# Patient Record
Sex: MALE | Race: WHITE | Employment: OTHER | URBAN - METROPOLITAN AREA
[De-identification: names, ages, dates, MRNs, and addresses within clinical notes are randomized per-mention and may not be internally consistent; named-entity substitution may affect disease eponyms.]

---

## 2017-11-09 PROBLEM — G45.9 TIA (TRANSIENT ISCHEMIC ATTACK): Status: ACTIVE | Noted: 2017-11-09

## 2017-11-09 PROBLEM — I10 HTN (HYPERTENSION): Status: ACTIVE | Noted: 2017-11-09

## 2018-01-09 PROBLEM — I63.9 ACUTE CVA (CEREBROVASCULAR ACCIDENT) (HCC): Status: ACTIVE | Noted: 2018-01-09

## 2018-01-10 PROBLEM — R00.1 BRADYCARDIA: Status: ACTIVE | Noted: 2018-01-10

## 2018-01-13 PROBLEM — E44.0 MODERATE MALNUTRITION (HCC): Chronic | Status: ACTIVE | Noted: 2018-01-13

## 2018-01-17 PROBLEM — I63.9 ISCHEMIC STROKE (HCC): Status: ACTIVE | Noted: 2018-01-17

## 2018-06-08 PROBLEM — K80.50 CHOLELITHIASIS, COMMON BILE DUCT: Status: ACTIVE | Noted: 2018-06-08

## 2018-08-28 ENCOUNTER — HOSPITAL ENCOUNTER (EMERGENCY)
Age: 83
Discharge: HOSPICE | DRG: 179 | End: 2018-08-29
Attending: EMERGENCY MEDICINE | Admitting: INTERNAL MEDICINE
Payer: MEDICARE

## 2018-08-28 DIAGNOSIS — R09.02 HYPOXIA: ICD-10-CM

## 2018-08-28 DIAGNOSIS — J18.9 HCAP (HEALTHCARE-ASSOCIATED PNEUMONIA): Primary | ICD-10-CM

## 2018-08-28 PROCEDURE — 99285 EMERGENCY DEPT VISIT HI MDM: CPT

## 2018-08-29 ENCOUNTER — APPOINTMENT (OUTPATIENT)
Dept: GENERAL RADIOLOGY | Age: 83
DRG: 179 | End: 2018-08-29
Payer: MEDICARE

## 2018-08-29 VITALS
WEIGHT: 150 LBS | BODY MASS INDEX: 21 KG/M2 | RESPIRATION RATE: 20 BRPM | DIASTOLIC BLOOD PRESSURE: 38 MMHG | TEMPERATURE: 97.9 F | OXYGEN SATURATION: 100 % | HEART RATE: 62 BPM | HEIGHT: 71 IN | SYSTOLIC BLOOD PRESSURE: 88 MMHG

## 2018-08-29 PROBLEM — R06.02 SHORTNESS OF BREATH: Status: ACTIVE | Noted: 2018-08-29

## 2018-08-29 LAB
ALBUMIN SERPL-MCNC: 3.8 G/DL (ref 3.4–5)
ALP BLD-CCNC: 180 U/L (ref 40–129)
ALT SERPL-CCNC: 50 U/L (ref 10–40)
ANION GAP SERPL CALCULATED.3IONS-SCNC: 11 MMOL/L (ref 3–16)
ANION GAP SERPL CALCULATED.3IONS-SCNC: 12 MMOL/L (ref 3–16)
AST SERPL-CCNC: 60 U/L (ref 15–37)
BANDED NEUTROPHILS RELATIVE PERCENT: 6 % (ref 0–7)
BASE EXCESS VENOUS: 8 (ref -3–3)
BASOPHILS ABSOLUTE: 0 K/UL (ref 0–0.2)
BASOPHILS ABSOLUTE: 0 K/UL (ref 0–0.2)
BASOPHILS RELATIVE PERCENT: 0 %
BASOPHILS RELATIVE PERCENT: 0 %
BILIRUB SERPL-MCNC: 1.7 MG/DL (ref 0–1)
BILIRUBIN DIRECT: 0.7 MG/DL (ref 0–0.3)
BILIRUBIN URINE: NEGATIVE
BILIRUBIN, INDIRECT: 1 MG/DL (ref 0–1)
BLOOD, URINE: NEGATIVE
BUN BLDV-MCNC: 46 MG/DL (ref 7–20)
BUN BLDV-MCNC: 48 MG/DL (ref 7–20)
CALCIUM SERPL-MCNC: 9.3 MG/DL (ref 8.3–10.6)
CALCIUM SERPL-MCNC: 9.4 MG/DL (ref 8.3–10.6)
CHLORIDE BLD-SCNC: 92 MMOL/L (ref 99–110)
CHLORIDE BLD-SCNC: 95 MMOL/L (ref 99–110)
CLARITY: CLEAR
CO2: 27 MMOL/L (ref 21–32)
CO2: 31 MMOL/L (ref 21–32)
COLOR: YELLOW
CREAT SERPL-MCNC: 1.1 MG/DL (ref 0.8–1.3)
CREAT SERPL-MCNC: 1.2 MG/DL (ref 0.8–1.3)
EOSINOPHILS ABSOLUTE: 0 K/UL (ref 0–0.6)
EOSINOPHILS ABSOLUTE: 0 K/UL (ref 0–0.6)
EOSINOPHILS RELATIVE PERCENT: 0 %
EOSINOPHILS RELATIVE PERCENT: 0 %
GFR AFRICAN AMERICAN: >60
GFR AFRICAN AMERICAN: >60
GFR NON-AFRICAN AMERICAN: 57
GFR NON-AFRICAN AMERICAN: >60
GLUCOSE BLD-MCNC: 124 MG/DL (ref 70–99)
GLUCOSE BLD-MCNC: 135 MG/DL (ref 70–99)
GLUCOSE URINE: NEGATIVE MG/DL
HCO3 VENOUS: 31 MMOL/L (ref 23–29)
HCT VFR BLD CALC: 30.9 % (ref 40.5–52.5)
HCT VFR BLD CALC: 35.1 % (ref 40.5–52.5)
HEMOGLOBIN: 10.9 G/DL (ref 13.5–17.5)
HEMOGLOBIN: 12.4 G/DL (ref 13.5–17.5)
KETONES, URINE: NEGATIVE MG/DL
LACTATE: 1.35 MMOL/L (ref 0.4–2)
LACTIC ACID: 1.7 MMOL/L (ref 0.4–2)
LEUKOCYTE ESTERASE, URINE: NEGATIVE
LYMPHOCYTES ABSOLUTE: 0.2 K/UL (ref 1–5.1)
LYMPHOCYTES ABSOLUTE: 0.8 K/UL (ref 1–5.1)
LYMPHOCYTES RELATIVE PERCENT: 1 %
LYMPHOCYTES RELATIVE PERCENT: 3.2 %
MAGNESIUM: 1.9 MG/DL (ref 1.8–2.4)
MCH RBC QN AUTO: 33.6 PG (ref 26–34)
MCH RBC QN AUTO: 33.7 PG (ref 26–34)
MCHC RBC AUTO-ENTMCNC: 35.3 G/DL (ref 31–36)
MCHC RBC AUTO-ENTMCNC: 35.3 G/DL (ref 31–36)
MCV RBC AUTO: 95.1 FL (ref 80–100)
MCV RBC AUTO: 95.3 FL (ref 80–100)
MICROSCOPIC EXAMINATION: NORMAL
MONOCYTES ABSOLUTE: 0.6 K/UL (ref 0–1.3)
MONOCYTES ABSOLUTE: 1.6 K/UL (ref 0–1.3)
MONOCYTES RELATIVE PERCENT: 4 %
MONOCYTES RELATIVE PERCENT: 6.5 %
NEUTROPHILS ABSOLUTE: 14.8 K/UL (ref 1.7–7.7)
NEUTROPHILS ABSOLUTE: 21.7 K/UL (ref 1.7–7.7)
NEUTROPHILS RELATIVE PERCENT: 89 %
NEUTROPHILS RELATIVE PERCENT: 90.3 %
NITRITE, URINE: NEGATIVE
O2 SAT, VEN: 51 %
PCO2, VEN: 40.5 MM HG (ref 40–50)
PDW BLD-RTO: 15 % (ref 12.4–15.4)
PDW BLD-RTO: 15.1 % (ref 12.4–15.4)
PERFORMED ON: ABNORMAL
PH UA: 6
PH VENOUS: 7.49 (ref 7.35–7.45)
PLATELET # BLD: 138 K/UL (ref 135–450)
PLATELET # BLD: 156 K/UL (ref 135–450)
PMV BLD AUTO: 8.6 FL (ref 5–10.5)
PMV BLD AUTO: 8.7 FL (ref 5–10.5)
PO2, VEN: 25 MM HG
POC SAMPLE TYPE: ABNORMAL
POTASSIUM REFLEX MAGNESIUM: 3.3 MMOL/L (ref 3.5–5.1)
POTASSIUM REFLEX MAGNESIUM: 3.6 MMOL/L (ref 3.5–5.1)
PRO-BNP: 693 PG/ML (ref 0–449)
PROCALCITONIN: 3.3 NG/ML (ref 0–0.15)
PROTEIN UA: NEGATIVE MG/DL
RBC # BLD: 3.24 M/UL (ref 4.2–5.9)
RBC # BLD: 3.69 M/UL (ref 4.2–5.9)
SODIUM BLD-SCNC: 134 MMOL/L (ref 136–145)
SODIUM BLD-SCNC: 134 MMOL/L (ref 136–145)
SPECIFIC GRAVITY UA: <=1.005
TCO2 CALC VENOUS: 32 MMOL/L
TOTAL PROTEIN: 6.8 G/DL (ref 6.4–8.2)
TROPONIN: <0.01 NG/ML
URINE REFLEX TO CULTURE: NORMAL
URINE TYPE: NORMAL
UROBILINOGEN, URINE: 0.2 E.U./DL
WBC # BLD: 15.6 K/UL (ref 4–11)
WBC # BLD: 24 K/UL (ref 4–11)

## 2018-08-29 PROCEDURE — 36415 COLL VENOUS BLD VENIPUNCTURE: CPT

## 2018-08-29 PROCEDURE — 80048 BASIC METABOLIC PNL TOTAL CA: CPT

## 2018-08-29 PROCEDURE — 81003 URINALYSIS AUTO W/O SCOPE: CPT

## 2018-08-29 PROCEDURE — 87205 SMEAR GRAM STAIN: CPT

## 2018-08-29 PROCEDURE — 96365 THER/PROPH/DIAG IV INF INIT: CPT

## 2018-08-29 PROCEDURE — 83880 ASSAY OF NATRIURETIC PEPTIDE: CPT

## 2018-08-29 PROCEDURE — 85025 COMPLETE CBC W/AUTO DIFF WBC: CPT

## 2018-08-29 PROCEDURE — 6370000000 HC RX 637 (ALT 250 FOR IP): Performed by: STUDENT IN AN ORGANIZED HEALTH CARE EDUCATION/TRAINING PROGRAM

## 2018-08-29 PROCEDURE — 83605 ASSAY OF LACTIC ACID: CPT

## 2018-08-29 PROCEDURE — 87449 NOS EACH ORGANISM AG IA: CPT

## 2018-08-29 PROCEDURE — 94760 N-INVAS EAR/PLS OXIMETRY 1: CPT

## 2018-08-29 PROCEDURE — 1200000000 HC SEMI PRIVATE

## 2018-08-29 PROCEDURE — 87040 BLOOD CULTURE FOR BACTERIA: CPT

## 2018-08-29 PROCEDURE — 94667 MNPJ CHEST WALL 1ST: CPT

## 2018-08-29 PROCEDURE — 71045 X-RAY EXAM CHEST 1 VIEW: CPT

## 2018-08-29 PROCEDURE — 6360000002 HC RX W HCPCS: Performed by: STUDENT IN AN ORGANIZED HEALTH CARE EDUCATION/TRAINING PROGRAM

## 2018-08-29 PROCEDURE — 87077 CULTURE AEROBIC IDENTIFY: CPT

## 2018-08-29 PROCEDURE — 6360000002 HC RX W HCPCS: Performed by: EMERGENCY MEDICINE

## 2018-08-29 PROCEDURE — 87186 SC STD MICRODIL/AGAR DIL: CPT

## 2018-08-29 PROCEDURE — 83735 ASSAY OF MAGNESIUM: CPT

## 2018-08-29 PROCEDURE — 74018 RADEX ABDOMEN 1 VIEW: CPT

## 2018-08-29 PROCEDURE — 2580000003 HC RX 258: Performed by: EMERGENCY MEDICINE

## 2018-08-29 PROCEDURE — 80076 HEPATIC FUNCTION PANEL: CPT

## 2018-08-29 PROCEDURE — 2580000003 HC RX 258: Performed by: INTERNAL MEDICINE

## 2018-08-29 PROCEDURE — 96361 HYDRATE IV INFUSION ADD-ON: CPT

## 2018-08-29 PROCEDURE — 94799 UNLISTED PULMONARY SVC/PX: CPT

## 2018-08-29 PROCEDURE — 2580000003 HC RX 258: Performed by: PHYSICIAN ASSISTANT

## 2018-08-29 PROCEDURE — 84145 PROCALCITONIN (PCT): CPT

## 2018-08-29 PROCEDURE — 82803 BLOOD GASES ANY COMBINATION: CPT

## 2018-08-29 PROCEDURE — 84484 ASSAY OF TROPONIN QUANT: CPT

## 2018-08-29 PROCEDURE — 2580000003 HC RX 258: Performed by: STUDENT IN AN ORGANIZED HEALTH CARE EDUCATION/TRAINING PROGRAM

## 2018-08-29 PROCEDURE — 94668 MNPJ CHEST WALL SBSQ: CPT

## 2018-08-29 PROCEDURE — 87070 CULTURE OTHR SPECIMN AEROBIC: CPT

## 2018-08-29 PROCEDURE — 87185 SC STD ENZYME DETCJ PER NZM: CPT

## 2018-08-29 PROCEDURE — 93005 ELECTROCARDIOGRAM TRACING: CPT | Performed by: PHYSICIAN ASSISTANT

## 2018-08-29 RX ORDER — ONDANSETRON 2 MG/ML
4 INJECTION INTRAMUSCULAR; INTRAVENOUS EVERY 6 HOURS PRN
Status: DISCONTINUED | OUTPATIENT
Start: 2018-08-29 | End: 2018-08-29 | Stop reason: HOSPADM

## 2018-08-29 RX ORDER — DOXAZOSIN MESYLATE 4 MG/1
4 TABLET ORAL DAILY
Status: DISCONTINUED | OUTPATIENT
Start: 2018-08-29 | End: 2018-08-29

## 2018-08-29 RX ORDER — ATORVASTATIN CALCIUM 40 MG/1
40 TABLET, FILM COATED ORAL NIGHTLY
Status: DISCONTINUED | OUTPATIENT
Start: 2018-08-29 | End: 2018-08-29 | Stop reason: HOSPADM

## 2018-08-29 RX ORDER — 0.9 % SODIUM CHLORIDE 0.9 %
1000 INTRAVENOUS SOLUTION INTRAVENOUS ONCE
Status: COMPLETED | OUTPATIENT
Start: 2018-08-29 | End: 2018-08-29

## 2018-08-29 RX ORDER — POLYETHYLENE GLYCOL 3350 17 G/17G
17 POWDER, FOR SOLUTION ORAL EVERY OTHER DAY
COMMUNITY

## 2018-08-29 RX ORDER — 0.9 % SODIUM CHLORIDE 0.9 %
500 INTRAVENOUS SOLUTION INTRAVENOUS ONCE
Status: COMPLETED | OUTPATIENT
Start: 2018-08-29 | End: 2018-08-29

## 2018-08-29 RX ORDER — URSODIOL 300 MG/1
300 CAPSULE ORAL 2 TIMES DAILY
Status: DISCONTINUED | OUTPATIENT
Start: 2018-08-29 | End: 2018-08-29 | Stop reason: HOSPADM

## 2018-08-29 RX ORDER — SODIUM CHLORIDE 0.9 % (FLUSH) 0.9 %
10 SYRINGE (ML) INJECTION EVERY 12 HOURS SCHEDULED
Status: DISCONTINUED | OUTPATIENT
Start: 2018-08-29 | End: 2018-08-29 | Stop reason: HOSPADM

## 2018-08-29 RX ORDER — ACETAMINOPHEN 325 MG/1
650 TABLET ORAL EVERY 4 HOURS PRN
Status: DISCONTINUED | OUTPATIENT
Start: 2018-08-29 | End: 2018-08-29 | Stop reason: HOSPADM

## 2018-08-29 RX ORDER — URSODIOL 300 MG/1
300 CAPSULE ORAL
COMMUNITY

## 2018-08-29 RX ORDER — ASPIRIN 81 MG/1
81 TABLET, CHEWABLE ORAL DAILY
Status: ON HOLD | COMMUNITY
End: 2018-12-20 | Stop reason: HOSPADM

## 2018-08-29 RX ORDER — ASPIRIN 81 MG/1
324 TABLET, CHEWABLE ORAL DAILY
Status: DISCONTINUED | OUTPATIENT
Start: 2018-08-29 | End: 2018-08-29 | Stop reason: HOSPADM

## 2018-08-29 RX ORDER — SODIUM CHLORIDE 9 MG/ML
INJECTION, SOLUTION INTRAVENOUS CONTINUOUS
Status: DISPENSED | OUTPATIENT
Start: 2018-08-29 | End: 2018-08-29

## 2018-08-29 RX ORDER — POTASSIUM CHLORIDE 1.5 G/1.77G
40 POWDER, FOR SOLUTION ORAL 2 TIMES DAILY
Status: DISCONTINUED | OUTPATIENT
Start: 2018-08-29 | End: 2018-08-29

## 2018-08-29 RX ORDER — SPIRONOLACTONE 25 MG/1
25 TABLET ORAL DAILY
Status: DISCONTINUED | OUTPATIENT
Start: 2018-08-29 | End: 2018-08-29

## 2018-08-29 RX ORDER — HYDROCHLOROTHIAZIDE 25 MG/1
25 TABLET ORAL DAILY
Status: DISCONTINUED | OUTPATIENT
Start: 2018-08-29 | End: 2018-08-29

## 2018-08-29 RX ORDER — LORAZEPAM 2 MG/ML
0.5 INJECTION INTRAMUSCULAR EVERY 4 HOURS PRN
Status: DISCONTINUED | OUTPATIENT
Start: 2018-08-29 | End: 2018-08-29 | Stop reason: HOSPADM

## 2018-08-29 RX ORDER — MORPHINE SULFATE 2 MG/ML
2 INJECTION, SOLUTION INTRAMUSCULAR; INTRAVENOUS EVERY 4 HOURS PRN
Status: DISCONTINUED | OUTPATIENT
Start: 2018-08-29 | End: 2018-08-29 | Stop reason: HOSPADM

## 2018-08-29 RX ORDER — SODIUM CHLORIDE 0.9 % (FLUSH) 0.9 %
10 SYRINGE (ML) INJECTION PRN
Status: DISCONTINUED | OUTPATIENT
Start: 2018-08-29 | End: 2018-08-29 | Stop reason: HOSPADM

## 2018-08-29 RX ORDER — SPIRONOLACTONE AND HYDROCHLOROTHIAZIDE 25; 25 MG/1; MG/1
1 TABLET ORAL DAILY
Status: DISCONTINUED | OUTPATIENT
Start: 2018-08-29 | End: 2018-08-29 | Stop reason: SDUPTHER

## 2018-08-29 RX ORDER — MENTHOL AND ZINC OXIDE .44; 20.625 G/100G; G/100G
OINTMENT TOPICAL 2 TIMES DAILY PRN
COMMUNITY

## 2018-08-29 RX ORDER — ALLOPURINOL 100 MG/1
50 TABLET ORAL 2 TIMES DAILY
Status: DISCONTINUED | OUTPATIENT
Start: 2018-08-29 | End: 2018-08-29 | Stop reason: HOSPADM

## 2018-08-29 RX ORDER — MAGNESIUM SULFATE IN WATER 40 MG/ML
2 INJECTION, SOLUTION INTRAVENOUS
Status: COMPLETED | OUTPATIENT
Start: 2018-08-29 | End: 2018-08-29

## 2018-08-29 RX ORDER — NYSTATIN 100000 U/G
CREAM TOPICAL 2 TIMES DAILY
COMMUNITY

## 2018-08-29 RX ADMIN — SODIUM CHLORIDE 1000 ML: 9 INJECTION, SOLUTION INTRAVENOUS at 11:44

## 2018-08-29 RX ADMIN — PIPERACILLIN SODIUM AND TAZOBACTAM SODIUM 3.38 G: 3; .375 INJECTION, POWDER, LYOPHILIZED, FOR SOLUTION INTRAVENOUS at 11:35

## 2018-08-29 RX ADMIN — ENOXAPARIN SODIUM 40 MG: 40 INJECTION SUBCUTANEOUS at 10:48

## 2018-08-29 RX ADMIN — SODIUM CHLORIDE 500 ML: 9 INJECTION, SOLUTION INTRAVENOUS at 01:31

## 2018-08-29 RX ADMIN — SODIUM CHLORIDE 1000 ML: 9 INJECTION, SOLUTION INTRAVENOUS at 09:36

## 2018-08-29 RX ADMIN — MAGNESIUM SULFATE HEPTAHYDRATE 2 G: 40 INJECTION, SOLUTION INTRAVENOUS at 09:11

## 2018-08-29 RX ADMIN — SODIUM CHLORIDE: 9 INJECTION, SOLUTION INTRAVENOUS at 07:44

## 2018-08-29 RX ADMIN — POTASSIUM CHLORIDE 40 MEQ: 1.5 POWDER, FOR SOLUTION ORAL at 10:48

## 2018-08-29 RX ADMIN — SODIUM CHLORIDE 1000 ML: 9 INJECTION, SOLUTION INTRAVENOUS at 12:18

## 2018-08-29 RX ADMIN — DEXTROSE MONOHYDRATE 1250 MG: 5 INJECTION, SOLUTION INTRAVENOUS at 05:08

## 2018-08-29 RX ADMIN — CEFEPIME 1 G: 1 INJECTION, POWDER, FOR SOLUTION INTRAMUSCULAR; INTRAVENOUS at 03:18

## 2018-08-29 RX ADMIN — SODIUM CHLORIDE 500 ML: 9 INJECTION, SOLUTION INTRAVENOUS at 08:15

## 2018-08-29 RX ADMIN — CEFEPIME HYDROCHLORIDE 2 G: 2 INJECTION, POWDER, FOR SOLUTION INTRAVENOUS at 07:44

## 2018-08-29 ASSESSMENT — ENCOUNTER SYMPTOMS
SORE THROAT: 0
NAUSEA: 0
DIARRHEA: 0
SINUS PRESSURE: 0
SPUTUM PRODUCTION: 1
HEMOPTYSIS: 0
VOMITING: 0
SHORTNESS OF BREATH: 1
CHEST TIGHTNESS: 0
ORTHOPNEA: 0
WHEEZING: 0
COUGH: 1
BLOOD IN STOOL: 0
SHORTNESS OF BREATH: 0
CONSTIPATION: 0
ABDOMINAL PAIN: 0

## 2018-08-29 ASSESSMENT — PAIN SCALES - GENERAL
PAINLEVEL_OUTOF10: 0
PAINLEVEL_OUTOF10: 0

## 2018-08-29 NOTE — H&P
lung base opacity suspicious for PNA. Patient given IVF, Cefepime and Vancomycin and was admitted for increased O2 requirement. Past Medical History:          Diagnosis Date    BPH (benign prostatic hyperplasia)     Cerebral artery occlusion with cerebral infarction ([address]     TIA    Gout     Hyperlipidemia     Hypertension     PNA (pneumonia)        Past Surgical History:          Procedure Laterality Date    CAROTID ENDARTERECTOMY Bilateral     at least 20 yrs ago at [facility]       Medications Prior to Admission:      Prior to Admission medications    Medication Sig Start Date End Date Taking? Authorizing Provider   ursodiol (ACTIGALL) 300 MG capsule 1 capsule by Per G Tube route 2 times daily 6/14/18   Liz Hernández MD   spironolactone-hydrochlorothiazide Heydi Loth) 25-25 MG per tablet Take by mouth 4/4/18   Historical Provider, MD   sertraline (ZOLOFT) 50 MG tablet Take 1 tablet by mouth daily 2/21/18   Trip Albarran MD   acetaminophen (TYLENOL) 325 MG tablet 2 tablets by PEG Tube route every 4 hours as needed for Pain or Fever 1/17/18   Megan Eisenberg MD   aspirin 81 MG chewable tablet 4 tablets by PEG Tube route daily 1/18/18   Megan Eisenberg MD   atorvastatin (LIPITOR) 40 MG tablet 1 tablet by PEG Tube route nightly 1/17/18   Megan Eisenberg MD   doxazosin (CARDURA) 4 MG tablet 1 tablet by PEG Tube route daily 1/18/18   Megan Eisenberg MD   allopurinol (ZYLOPRIM) 100 MG tablet Take 50 mg by mouth 2 times daily    Historical Provider, MD       Allergies:  Ciprofloxacin    Social History:      The patient currently lives at home with home hospice. Has 24 hour aide. TOBACCO:   reports that he has quit smoking. His smoking use included Cigarettes. He has never used smokeless tobacco.  ETOH:   reports that he drinks about 0.6 oz of alcohol per week . Family History:       Reviewed in detail and negative for DM, CAD, Cancer, CVA.  Positive as follows:    Family History   Problem

## 2018-08-29 NOTE — DISCHARGE SUMMARY
Trachea midline. Respiratory:  Coarse breath sounds   Cardiovascular:  Regular rate and rhythm with normal S1/S2 without murmurs, rubs or gallops. Abdomen: Soft, non-tender, non-distended with normal bowel sounds. PEG tube in place   Musculoskeletal:  No clubbing, cyanosis or edema bilaterally. Full range of motion without deformity. Skin: Skin color, texture, turgor normal.  No rashes or lesions. Neurologic:  Neurovascularly intact without any focal sensory/motor deficits. Cranial nerves: II-XII intact, grossly non-focal.  Psychiatric:  Alert and oriented, thought content appropriate, normal insight      Consults:     PHARMACY TO DOSE VANCOMYCIN  IP CONSULT TO HOSPITALIST  IP CONSULT TO DIETITIAN  IP CONSULT TO PALLIATIVE CARE  IP CONSULT TO DIETITIAN  IP CONSULT TO DIETITIAN  IP CONSULT TO HOSPICE  PHARMACY TO DOSE VANCOMYCIN    Labs: For convenience and continuity at follow-up the following most recent labs are provided:    Lab Results   Component Value Date    WBC 24.0 08/29/2018    HGB 10.9 08/29/2018    HCT 30.9 08/29/2018    MCV 95.3 08/29/2018     08/29/2018     08/29/2018    K 3.6 08/29/2018    CL 95 08/29/2018    CO2 27 08/29/2018    BUN 48 08/29/2018    CREATININE 1.2 08/29/2018    CALCIUM 9.4 08/29/2018    PHOS 4.2 06/10/2018    .0 01/09/2018    ALKPHOS 180 08/29/2018    ALT 50 08/29/2018    AST 60 08/29/2018    BILITOT 1.7 08/29/2018    BILIDIR 0.7 08/29/2018    LABALBU 3.8 08/29/2018    LDLCALC 70 01/10/2018    TRIG 73 01/10/2018     Lab Results   Component Value Date    INR 1.25 (H) 06/08/2018    INR 1.29 (H) 01/29/2018    INR 1.03 01/12/2018       Radiology:  XR ABDOMEN (KUB) (SINGLE AP VIEW)   Final Result   1. No acute abnormality. Percutaneous gastrostomy tube in the left upper quadrant. XR CHEST PORTABLE   Final Result   Increasing left lung base opacity suspicious for pneumonia in the    appropriate clinical context. Recommend follow-up to resolution.           XR

## 2018-08-29 NOTE — CONSULTS
~1.4. Pt had trough of 9.9 (drawn ~2h early). Dose adjusted to 1.25g IV q24h. · Received vancomycin 1.25g in ED 8/29. · Will continue vancomycin 1.25g IV q24h based on previous experience with vancomycin dosing in this patient. · Trough will be ordered when appropriate. Target trough: 15-20 mcg/mL. · Renal function will be monitored closely /dose will be adjusted as appropriate. Please call with any questions.   Lia Lau PharmD, BCPS  Wireless: [phone]  or [phone]  8/29/2018 11:41 AM

## 2018-08-29 NOTE — ED PROVIDER NOTES
surgical history that includes Carotid endarterectomy (Bilateral). His family history includes Diabetes in his mother. He reports that he has quit smoking. His smoking use included Cigarettes. He has never used smokeless tobacco. He reports that he drinks about 0.6 oz of alcohol per week . He reports that he does not use drugs. Medications     Previous Medications    ACETAMINOPHEN (TYLENOL) 325 MG TABLET    2 tablets by PEG Tube route every 4 hours as needed for Pain or Fever    ALLOPURINOL (ZYLOPRIM) 100 MG TABLET    Take 50 mg by mouth 2 times daily    ASPIRIN 81 MG CHEWABLE TABLET    4 tablets by PEG Tube route daily    ATORVASTATIN (LIPITOR) 40 MG TABLET    1 tablet by PEG Tube route nightly    DOXAZOSIN (CARDURA) 4 MG TABLET    1 tablet by PEG Tube route daily    SERTRALINE (ZOLOFT) 50 MG TABLET    Take 1 tablet by mouth daily    SPIRONOLACTONE-HYDROCHLOROTHIAZIDE (ALDACTAZIDE) 25-25 MG PER TABLET    Take by mouth    URSODIOL (ACTIGALL) 300 MG CAPSULE    1 capsule by Per G Tube route 2 times daily       Allergies     He is allergic to ciprofloxacin. Physical Exam     INITIAL VITALS: BP: (!) 117/37, Temp: 98.9 °F (37.2 °C), Pulse: 101, Resp: 18, SpO2: 92 %   Physical Exam    General: Elderly appearing  male who appears mildly uncomfortable but nontoxic. HEENT:  Normocephalic, atraumatic. Pupils equal, sclera white. Nares patent bilaterally. Handling secretions without difficulty. Oropharynx patent. Neck: No meningismus. Trachea midline    Pulmonary: Respirations even but shallow. Decreased air movement throughout. Cardiac: Chest symmetrical and non-tender on palpation of chest wall. RRR. no M/R/G. Abdomen:  Non-distended. PEG tube in place without drainage or infectious findings. Musculoskeletal:   Atraumatic exam with no focal swelling or tenderness. No peripheral edema. Neuro:  Alert and oriented x 3. CN II - XII grossly intact.  Moves all extremities

## 2018-08-29 NOTE — CARE COORDINATION
Pt is from home with 24h caregivers. Pt was active with [facility] services prior to admission. Terminal Diagnosis is CVA. Hospice temporarily revoked for aggressive treatment in the hospital. [facility] rep, Bushra Pascual, stated she will continue to follow while pt is here. Clarification of [place] DNR form will be faxed. At discharge, Bushra Pascual will arrange transportation. Roosvelt Heimlich, RN  Case Management  [phone]    Addendum [facility] from [facility] is here to see patient and his family.

## 2018-08-30 LAB
L. PNEUMOPHILA SEROGP 1 UR AG: NORMAL
STREP PNEUMONIAE ANTIGEN, URINE: NORMAL

## 2018-08-31 LAB
CULTURE, RESPIRATORY: ABNORMAL
EKG ATRIAL RATE: 104 BPM
EKG DIAGNOSIS: NORMAL
EKG P AXIS: 35 DEGREES
EKG P-R INTERVAL: 134 MS
EKG Q-T INTERVAL: 416 MS
EKG QRS DURATION: 86 MS
EKG QTC CALCULATION (BAZETT): 547 MS
EKG R AXIS: -32 DEGREES
EKG T AXIS: 75 DEGREES
EKG VENTRICULAR RATE: 104 BPM
GRAM STAIN RESULT: ABNORMAL
ORGANISM: ABNORMAL
ORGANISM: ABNORMAL

## 2018-09-03 LAB
BLOOD CULTURE, ROUTINE: NORMAL
CULTURE, BLOOD 2: NORMAL

## 2018-11-25 ENCOUNTER — APPOINTMENT (OUTPATIENT)
Dept: CT IMAGING | Age: 83
End: 2018-11-25
Payer: MEDICARE

## 2018-11-25 ENCOUNTER — HOSPITAL ENCOUNTER (EMERGENCY)
Age: 83
Discharge: HOME | End: 2018-11-25
Attending: EMERGENCY MEDICINE
Payer: MEDICARE

## 2018-11-25 ENCOUNTER — APPOINTMENT (OUTPATIENT)
Dept: GENERAL RADIOLOGY | Age: 83
End: 2018-11-25
Payer: MEDICARE

## 2018-11-25 VITALS
OXYGEN SATURATION: 100 % | RESPIRATION RATE: 21 BRPM | HEART RATE: 55 BPM | TEMPERATURE: 97.5 F | BODY MASS INDEX: 21.22 KG/M2 | WEIGHT: 150 LBS | DIASTOLIC BLOOD PRESSURE: 53 MMHG | SYSTOLIC BLOOD PRESSURE: 124 MMHG

## 2018-11-25 DIAGNOSIS — K80.50 BILIARY COLIC: Primary | ICD-10-CM

## 2018-11-25 LAB
ALBUMIN SERPL-MCNC: 3.5 G/DL (ref 3.4–5)
ALP BLD-CCNC: 180 U/L (ref 40–129)
ALT SERPL-CCNC: 30 U/L (ref 10–40)
AST SERPL-CCNC: 37 U/L (ref 15–37)
BACTERIA: ABNORMAL /HPF
BASE EXCESS VENOUS: 5 (ref -3–3)
BASOPHILS ABSOLUTE: 0 K/UL (ref 0–0.2)
BASOPHILS RELATIVE PERCENT: 0.2 %
BILIRUB SERPL-MCNC: 0.7 MG/DL (ref 0–1)
BILIRUBIN DIRECT: <0.2 MG/DL (ref 0–0.3)
BILIRUBIN URINE: NEGATIVE MG/DL
BILIRUBIN, INDIRECT: ABNORMAL MG/DL (ref 0–1)
BLOOD, URINE: NEGATIVE
CALCIUM IONIZED: 1.18 MMOL/L (ref 1.12–1.32)
CLARITY: ABNORMAL
CO2: 29 MMOL/L (ref 21–32)
COLOR: ABNORMAL
EOSINOPHILS ABSOLUTE: 0.1 K/UL (ref 0–0.6)
EOSINOPHILS RELATIVE PERCENT: 1 %
EPITHELIAL CELLS, UA: ABNORMAL /HPF
GFR AFRICAN AMERICAN: >60
GFR NON-AFRICAN AMERICAN: >60
GLUCOSE BLD-MCNC: 126 MG/DL (ref 70–99)
GLUCOSE URINE: NEGATIVE MG/DL
HCO3 VENOUS: 29.4 MMOL/L (ref 23–29)
HCT VFR BLD CALC: 31.5 % (ref 40.5–52.5)
HEMOGLOBIN: 10.9 G/DL (ref 13.5–17.5)
KETONES, URINE: NEGATIVE MG/DL
LACTATE: 1.11 MMOL/L (ref 0.4–2)
LEUKOCYTE ESTERASE, URINE: NEGATIVE
LIPASE: 14 U/L (ref 13–60)
LYMPHOCYTES ABSOLUTE: 0.6 K/UL (ref 1–5.1)
LYMPHOCYTES RELATIVE PERCENT: 9 %
MCH RBC QN AUTO: 34 PG (ref 26–34)
MCHC RBC AUTO-ENTMCNC: 34.5 G/DL (ref 31–36)
MCV RBC AUTO: 98.6 FL (ref 80–100)
MICROSCOPIC EXAMINATION: YES
MONOCYTES ABSOLUTE: 0.2 K/UL (ref 0–1.3)
MONOCYTES RELATIVE PERCENT: 3.4 %
NEUTROPHILS ABSOLUTE: 5.5 K/UL (ref 1.7–7.7)
NEUTROPHILS RELATIVE PERCENT: 86.4 %
NITRITE, URINE: NEGATIVE
O2 SAT, VEN: 54 %
PCO2, VEN: 43.8 MM HG (ref 40–50)
PDW BLD-RTO: 14 % (ref 12.4–15.4)
PERFORMED ON: ABNORMAL
PERFORMED ON: ABNORMAL
PH UA: 8.5
PH VENOUS: 7.43 (ref 7.35–7.45)
PLATELET # BLD: 101 K/UL (ref 135–450)
PMV BLD AUTO: 9.3 FL (ref 5–10.5)
PO2, VEN: 28 MM HG
POC ANION GAP: 11 (ref 10–20)
POC BUN: 24 MG/DL (ref 7–18)
POC CHLORIDE: 100 MMOL/L (ref 99–110)
POC CREATININE: 1.1 MG/DL (ref 0.8–1.3)
POC POTASSIUM: 4 MMOL/L (ref 3.5–5.1)
POC SAMPLE TYPE: ABNORMAL
POC SAMPLE TYPE: ABNORMAL
POC SODIUM: 140 MMOL/L (ref 136–145)
PROTEIN UA: 30 MG/DL
RBC # BLD: 3.2 M/UL (ref 4.2–5.9)
RBC UA: ABNORMAL /HPF (ref 0–2)
SPECIFIC GRAVITY UA: 1.01
TCO2 CALC VENOUS: 31 MMOL/L
TOTAL PROTEIN: 5.8 G/DL (ref 6.4–8.2)
UROBILINOGEN, URINE: 1 E.U./DL
WBC # BLD: 6.4 K/UL (ref 4–11)
WBC UA: ABNORMAL /HPF (ref 0–5)

## 2018-11-25 PROCEDURE — 81001 URINALYSIS AUTO W/SCOPE: CPT

## 2018-11-25 PROCEDURE — 80047 BASIC METABLC PNL IONIZED CA: CPT

## 2018-11-25 PROCEDURE — 83605 ASSAY OF LACTIC ACID: CPT

## 2018-11-25 PROCEDURE — 82803 BLOOD GASES ANY COMBINATION: CPT

## 2018-11-25 PROCEDURE — 83690 ASSAY OF LIPASE: CPT

## 2018-11-25 PROCEDURE — 80076 HEPATIC FUNCTION PANEL: CPT

## 2018-11-25 PROCEDURE — 74177 CT ABD & PELVIS W/CONTRAST: CPT

## 2018-11-25 PROCEDURE — 85025 COMPLETE CBC W/AUTO DIFF WBC: CPT

## 2018-11-25 PROCEDURE — 6360000004 HC RX CONTRAST MEDICATION: Performed by: EMERGENCY MEDICINE

## 2018-11-25 PROCEDURE — 99284 EMERGENCY DEPT VISIT MOD MDM: CPT

## 2018-11-25 PROCEDURE — 74022 RADEX COMPL AQT ABD SERIES: CPT

## 2018-11-25 RX ORDER — OXYCODONE HYDROCHLORIDE 5 MG/1
5 TABLET ORAL EVERY 6 HOURS PRN
Qty: 10 TABLET | Refills: 0 | Status: SHIPPED | OUTPATIENT
Start: 2018-11-25 | End: 2018-11-30

## 2018-11-25 RX ADMIN — IOPAMIDOL 80 ML: 755 INJECTION, SOLUTION INTRAVENOUS at 11:48

## 2018-11-25 ASSESSMENT — ENCOUNTER SYMPTOMS
CHEST TIGHTNESS: 0
ABDOMINAL PAIN: 0
SHORTNESS OF BREATH: 0
VOMITING: 1
DIARRHEA: 0
CONSTIPATION: 0

## 2018-11-25 NOTE — ED PROVIDER NOTES
in conjunction with emergency department physician Hilton Leija MD    RADIOLOGY:  CT ABDOMEN PELVIS W IV CONTRAST Additional Contrast? None   Final Result      1. Cholelithiasis with stone at the neck of the gallbladder. No findings to suggest acute inflammation. Clinical correlation is recommended. 2. Mild wall thickening of the sigmoid colon which may reflect a mild focus of diverticulitis or colitis. 3. Trace ascites. 4. Prostate gland enlargement. 5. Splenomegaly. 6. Coronary artery calcification. XR Acute Abd Series Chest 1 VW   Final Result   1. No acute pulmonary disease. 2. No obstruction or free intraperitoneal air. CT ABDOMEN PELVIS W IV CONTRAST Additional Contrast? None   Final Result      1. Cholelithiasis with stone at the neck of the gallbladder. No findings to suggest acute inflammation. Clinical correlation is recommended. 2. Mild wall thickening of the sigmoid colon which may reflect a mild focus of diverticulitis or colitis. 3. Trace ascites. 4. Prostate gland enlargement. 5. Splenomegaly. 6. Coronary artery calcification. XR Acute Abd Series Chest 1 VW   Final Result   1. No acute pulmonary disease. 2. No obstruction or free intraperitoneal air.           LABS:   Results for orders placed or performed during the hospital encounter of 11/25/18   CBC auto differential   Result Value Ref Range    WBC 6.4 4.0 - 11.0 K/uL    RBC 3.20 (L) 4.20 - 5.90 M/uL    Hemoglobin 10.9 (L) 13.5 - 17.5 g/dL    Hematocrit 31.5 (L) 40.5 - 52.5 %    MCV 98.6 80.0 - 100.0 fL    MCH 34.0 26.0 - 34.0 pg    MCHC 34.5 31.0 - 36.0 g/dL    RDW 14.0 12.4 - 15.4 %    Platelets 565 (L) 432 - 450 K/uL    MPV 9.3 5.0 - 10.5 fL    Neutrophils % 86.4 %    Lymphocytes % 9.0 %    Monocytes % 3.4 %    Eosinophils % 1.0 %    Basophils % 0.2 %    Neutrophils # 5.5 1.7 - 7.7 K/uL    Lymphocytes # 0.6 (L) 1.0 - 5.1 K/uL    Monocytes # 0.2 0.0 - 1.3 K/uL    Eosinophils # 0.1 0.0 - 0.6 Negative Negative    Microscopic Examination Yes        Labs Reviewed   CBC WITH AUTO DIFFERENTIAL - Abnormal; Notable for the following:        Result Value    RBC 3.20 (*)     Hemoglobin 10.9 (*)     Hematocrit 31.5 (*)     Platelets 135 (*)     Lymphocytes # 0.6 (*)     All other components within normal limits    Narrative:     Performed at: [facility] - [place] [address]   Phone [phone]   HEPATIC FUNCTION PANEL - Abnormal; Notable for the following: Total Protein 5.8 (*)     Alkaline Phosphatase 180 (*)     All other components within normal limits    Narrative:     Performed at: [facility] - [place] [address]   Phone [phone]   MICROSCOPIC URINALYSIS - Abnormal; Notable for the following:     RBC, UA 5-10 (*)     Bacteria, UA Rare (*)     All other components within normal limits    Narrative:     Performed at: [facility] - [place] [address]   Phone [phone]   POCT VENOUS - Abnormal; Notable for the following:     POC Glucose 126 (*)     POC BUN 24 (*)     All other components within normal limits    Narrative:     Performed at: [facility] - [place] [address]   Phone [phone]   POCT VENOUS - Abnormal; Notable for the following:     HCO3, Venous 29.4 (*)     Base Excess, Jack 5 (*)     All other components within normal limits    Narrative:     Performed at: [facility] - [place] [address]   Phone [phone]   POC URINE WITH MICROSCOPIC - Abnormal; Notable for the following:     pH, UA 8.5 (*)     Protein, UA 30 (*)     All other components within normal limits    Narrative:     Performed at:   The [address] [place] [address]   [address],  [place], [address]   Phone [phone]   LIPASE    Narrative:     Performed at: [facility] - [facility]  [address] [facility],  [place], [address]   Phone [phone]   POCT CHEM BASIC W ICA   POCT LACTIC ACID (LACTATE)   POCT BLOOD GASES   POC URINE WITH MICROSCOPIC       ED BEDSIDE ULTRASOUND:      RECENT VITALS:  BP: (!) 124/53, Temp: 97.5 °F (36.4 °C), Pulse: 55, Resp: 21, SpO2: 100 %     Procedures         ED Course     Nursing Notes, Past Medical Hx, Past Surgical Hx, Social Hx, Allergies, and Family Hx were reviewed. The patient was given the following medications:  Orders Placed This Encounter   Medications    iopamidol (ISOVUE-370) 76 % injection 80 mL    oxyCODONE (ROXICODONE) 5 MG immediate release tablet     Sig: Take 1 tablet by mouth every 6 hours as needed for Pain for up to 5 days. .     Dispense:  10 tablet     Refill:  0          CONSULTS:  None    MEDICAL DECISION MAKING / ASSESSMENT / PLAN     Uli Danielson is a 80 y.o. male who presents to the emergency department for vomiting that started this morning that was green in color. Patient has a past medical history of CVA with G-tube secondary to pharyngeal dysphagia. He also has a history of bradycardia, generalized weakness and shortness of breath. On arrival to the emergency department he was normotensive and bradycardic at 45. SPO2 was 98% on room air. Abdomen was soft and nontender. G-tube site looks unremarkable. Clear breath sounds however did have a slightly diminished right upper lobe breath sounds. An IV was established and labs were obtained. Chemistry profile was essentially unremarkable except for BUN of 24. Normal creatinine. Normal lactate and VBG. CBC with nonspecific anemia similar to previous labs. LFTs with elevated alk phos of 180 however similar to previous labs as well. Normal lipase. Acute abdominal series showed no acute pulmonary disease.   There was no

## 2018-12-17 ENCOUNTER — HOSPITAL ENCOUNTER (INPATIENT)
Age: 83
LOS: 3 days | Discharge: HOME | DRG: 381 | End: 2018-12-20
Attending: EMERGENCY MEDICINE | Admitting: INTERNAL MEDICINE
Payer: MEDICARE

## 2018-12-17 DIAGNOSIS — K92.2 UPPER GI BLEED: Primary | ICD-10-CM

## 2018-12-17 LAB
ABO/RH: NORMAL
ALBUMIN SERPL-MCNC: 3.2 G/DL (ref 3.4–5)
ALP BLD-CCNC: 144 U/L (ref 40–129)
ALT SERPL-CCNC: 26 U/L (ref 10–40)
ANION GAP SERPL CALCULATED.3IONS-SCNC: 9 MMOL/L (ref 3–16)
ANTIBODY SCREEN: NORMAL
AST SERPL-CCNC: 34 U/L (ref 15–37)
BASOPHILS ABSOLUTE: 0 K/UL (ref 0–0.2)
BASOPHILS RELATIVE PERCENT: 0.4 %
BILIRUB SERPL-MCNC: 0.4 MG/DL (ref 0–1)
BILIRUBIN DIRECT: <0.2 MG/DL (ref 0–0.3)
BILIRUBIN, INDIRECT: ABNORMAL MG/DL (ref 0–1)
BUN BLDV-MCNC: 43 MG/DL (ref 7–20)
CALCIUM SERPL-MCNC: 9 MG/DL (ref 8.3–10.6)
CHLORIDE BLD-SCNC: 102 MMOL/L (ref 99–110)
CO2: 30 MMOL/L (ref 21–32)
CREAT SERPL-MCNC: 1.1 MG/DL (ref 0.8–1.3)
EOSINOPHILS ABSOLUTE: 0.1 K/UL (ref 0–0.6)
EOSINOPHILS RELATIVE PERCENT: 2.8 %
GFR AFRICAN AMERICAN: >60
GFR NON-AFRICAN AMERICAN: >60
GLUCOSE BLD-MCNC: 122 MG/DL (ref 70–99)
HCT VFR BLD CALC: 21.6 % (ref 40.5–52.5)
HCT VFR BLD CALC: 23.2 % (ref 40.5–52.5)
HEMOGLOBIN: 7.4 G/DL (ref 13.5–17.5)
HEMOGLOBIN: 7.8 G/DL (ref 13.5–17.5)
INR BLD: 1.1 (ref 0.86–1.14)
LIPASE: 39 U/L (ref 13–60)
LYMPHOCYTES ABSOLUTE: 0.7 K/UL (ref 1–5.1)
LYMPHOCYTES RELATIVE PERCENT: 13.9 %
MCH RBC QN AUTO: 34.5 PG (ref 26–34)
MCHC RBC AUTO-ENTMCNC: 33.6 G/DL (ref 31–36)
MCV RBC AUTO: 102.5 FL (ref 80–100)
MONOCYTES ABSOLUTE: 0.3 K/UL (ref 0–1.3)
MONOCYTES RELATIVE PERCENT: 6.1 %
NEUTROPHILS ABSOLUTE: 3.8 K/UL (ref 1.7–7.7)
NEUTROPHILS RELATIVE PERCENT: 76.8 %
PDW BLD-RTO: 14.7 % (ref 12.4–15.4)
PLATELET # BLD: 103 K/UL (ref 135–450)
PMV BLD AUTO: 9.5 FL (ref 5–10.5)
POTASSIUM SERPL-SCNC: 4.4 MMOL/L (ref 3.5–5.1)
PROTHROMBIN TIME: 12.5 SEC (ref 9.8–13)
RBC # BLD: 2.26 M/UL (ref 4.2–5.9)
SODIUM BLD-SCNC: 141 MMOL/L (ref 136–145)
TOTAL PROTEIN: 5.4 G/DL (ref 6.4–8.2)
WBC # BLD: 5 K/UL (ref 4–11)

## 2018-12-17 PROCEDURE — 80048 BASIC METABOLIC PNL TOTAL CA: CPT

## 2018-12-17 PROCEDURE — 99285 EMERGENCY DEPT VISIT HI MDM: CPT

## 2018-12-17 PROCEDURE — C9113 INJ PANTOPRAZOLE SODIUM, VIA: HCPCS | Performed by: STUDENT IN AN ORGANIZED HEALTH CARE EDUCATION/TRAINING PROGRAM

## 2018-12-17 PROCEDURE — 1200000000 HC SEMI PRIVATE

## 2018-12-17 PROCEDURE — 6360000002 HC RX W HCPCS: Performed by: STUDENT IN AN ORGANIZED HEALTH CARE EDUCATION/TRAINING PROGRAM

## 2018-12-17 PROCEDURE — 86901 BLOOD TYPING SEROLOGIC RH(D): CPT

## 2018-12-17 PROCEDURE — 86850 RBC ANTIBODY SCREEN: CPT

## 2018-12-17 PROCEDURE — 96374 THER/PROPH/DIAG INJ IV PUSH: CPT

## 2018-12-17 PROCEDURE — 80076 HEPATIC FUNCTION PANEL: CPT

## 2018-12-17 PROCEDURE — 85610 PROTHROMBIN TIME: CPT

## 2018-12-17 PROCEDURE — 85014 HEMATOCRIT: CPT

## 2018-12-17 PROCEDURE — 86900 BLOOD TYPING SEROLOGIC ABO: CPT

## 2018-12-17 PROCEDURE — 6370000000 HC RX 637 (ALT 250 FOR IP): Performed by: STUDENT IN AN ORGANIZED HEALTH CARE EDUCATION/TRAINING PROGRAM

## 2018-12-17 PROCEDURE — 36415 COLL VENOUS BLD VENIPUNCTURE: CPT

## 2018-12-17 PROCEDURE — 83690 ASSAY OF LIPASE: CPT

## 2018-12-17 PROCEDURE — 2580000003 HC RX 258: Performed by: STUDENT IN AN ORGANIZED HEALTH CARE EDUCATION/TRAINING PROGRAM

## 2018-12-17 PROCEDURE — 86923 COMPATIBILITY TEST ELECTRIC: CPT

## 2018-12-17 PROCEDURE — 85025 COMPLETE CBC W/AUTO DIFF WBC: CPT

## 2018-12-17 PROCEDURE — C9113 INJ PANTOPRAZOLE SODIUM, VIA: HCPCS | Performed by: ANESTHESIOLOGY

## 2018-12-17 PROCEDURE — 6360000002 HC RX W HCPCS: Performed by: ANESTHESIOLOGY

## 2018-12-17 PROCEDURE — 85018 HEMOGLOBIN: CPT

## 2018-12-17 RX ORDER — PANTOPRAZOLE SODIUM 40 MG/10ML
80 INJECTION, POWDER, LYOPHILIZED, FOR SOLUTION INTRAVENOUS ONCE
Status: COMPLETED | OUTPATIENT
Start: 2018-12-17 | End: 2018-12-17

## 2018-12-17 RX ORDER — URSODIOL 300 MG/1
300 CAPSULE ORAL
Status: DISCONTINUED | OUTPATIENT
Start: 2018-12-18 | End: 2018-12-19

## 2018-12-17 RX ORDER — POLYETHYLENE GLYCOL 3350 17 G/17G
17 POWDER, FOR SOLUTION ORAL DAILY
Status: DISCONTINUED | OUTPATIENT
Start: 2018-12-17 | End: 2018-12-19

## 2018-12-17 RX ORDER — ATORVASTATIN CALCIUM 40 MG/1
40 TABLET, FILM COATED ORAL NIGHTLY
Status: DISCONTINUED | OUTPATIENT
Start: 2018-12-17 | End: 2018-12-20 | Stop reason: HOSPADM

## 2018-12-17 RX ORDER — SODIUM CHLORIDE 9 MG/ML
INJECTION, SOLUTION INTRAVENOUS CONTINUOUS
Status: DISCONTINUED | OUTPATIENT
Start: 2018-12-17 | End: 2018-12-18

## 2018-12-17 RX ORDER — ALLOPURINOL 100 MG/1
100 TABLET ORAL DAILY
Status: DISCONTINUED | OUTPATIENT
Start: 2018-12-17 | End: 2018-12-20 | Stop reason: HOSPADM

## 2018-12-17 RX ORDER — ACETAMINOPHEN 325 MG/1
650 TABLET ORAL EVERY 4 HOURS PRN
Status: DISCONTINUED | OUTPATIENT
Start: 2018-12-17 | End: 2018-12-17 | Stop reason: SDUPTHER

## 2018-12-17 RX ORDER — ONDANSETRON 2 MG/ML
4 INJECTION INTRAMUSCULAR; INTRAVENOUS EVERY 6 HOURS PRN
Status: DISCONTINUED | OUTPATIENT
Start: 2018-12-17 | End: 2018-12-20 | Stop reason: HOSPADM

## 2018-12-17 RX ORDER — SODIUM CHLORIDE 0.9 % (FLUSH) 0.9 %
10 SYRINGE (ML) INJECTION PRN
Status: DISCONTINUED | OUTPATIENT
Start: 2018-12-17 | End: 2018-12-20 | Stop reason: HOSPADM

## 2018-12-17 RX ORDER — ACETAMINOPHEN 325 MG/1
650 TABLET ORAL EVERY 4 HOURS PRN
Status: DISCONTINUED | OUTPATIENT
Start: 2018-12-17 | End: 2018-12-20 | Stop reason: HOSPADM

## 2018-12-17 RX ORDER — SODIUM CHLORIDE 0.9 % (FLUSH) 0.9 %
10 SYRINGE (ML) INJECTION EVERY 12 HOURS SCHEDULED
Status: DISCONTINUED | OUTPATIENT
Start: 2018-12-17 | End: 2018-12-20 | Stop reason: HOSPADM

## 2018-12-17 RX ADMIN — POLYETHYLENE GLYCOL (3350) 17 G: 17 POWDER, FOR SOLUTION ORAL at 19:06

## 2018-12-17 RX ADMIN — PANTOPRAZOLE SODIUM 80 MG: 40 INJECTION, POWDER, FOR SOLUTION INTRAVENOUS at 13:49

## 2018-12-17 RX ADMIN — ALLOPURINOL 100 MG: 100 TABLET ORAL at 19:06

## 2018-12-17 RX ADMIN — SODIUM CHLORIDE 8 MG/HR: 9 INJECTION, SOLUTION INTRAVENOUS at 19:05

## 2018-12-17 RX ADMIN — SERTRALINE HYDROCHLORIDE 50 MG: 50 TABLET ORAL at 19:06

## 2018-12-17 RX ADMIN — SODIUM CHLORIDE: 9 INJECTION, SOLUTION INTRAVENOUS at 18:42

## 2018-12-17 ASSESSMENT — ENCOUNTER SYMPTOMS
ABDOMINAL DISTENTION: 0
DIARRHEA: 0
ABDOMINAL PAIN: 1
SHORTNESS OF BREATH: 1
NAUSEA: 0
BLOOD IN STOOL: 1
RECTAL PAIN: 0
CONSTIPATION: 0
VOMITING: 0

## 2018-12-17 ASSESSMENT — PAIN SCALES - GENERAL
PAINLEVEL_OUTOF10: 0
PAINLEVEL_OUTOF10: 0

## 2018-12-17 NOTE — ED PROVIDER NOTES
none  Interpreted in conjunction with emergency department physician Dhara Pleitez MD      RADIOLOGY:  No orders to display       LABS:   Labs Reviewed   CBC WITH AUTO DIFFERENTIAL - Abnormal; Notable for the following:        Result Value    RBC 2.26 (*)     Hemoglobin 7.8 (*)     Hematocrit 23.2 (*)     .5 (*)     MCH 34.5 (*)     Platelets 289 (*)     Lymphocytes # 0.7 (*)     All other components within normal limits    Narrative:     Performed at: [facility] - [place] [address]   Phone [phone]   BASIC METABOLIC PANEL - Abnormal; Notable for the following:     Glucose 122 (*)     BUN 43 (*)     All other components within normal limits    Narrative:     Performed at: [facility] - [place] [address]   Phone [phone]   HEPATIC FUNCTION PANEL - Abnormal; Notable for the following: Total Protein 5.4 (*)     Alb 3.2 (*)     Alkaline Phosphatase 144 (*)     All other components within normal limits    Narrative:     Performed at: [facility] [address], [facility] [address]   Phone [phone]   PROTIME-INR    Narrative:     Performed at: [facility] [address], [facility] [address]   Phone [phone]   LIPASE    Narrative:     Performed at: [facility] [address], [facility] [address]   Phone [phone]   TYPE AND SCREEN    Narrative:     Performed at: [facility] [address], [facility] [address]   Phone [phone]       ED BEDSIDE ULTRASOUND:  None  This procedure was performed in the presence of the Emergency Medicine Attending.     RECENT VITALS:  BP: (!) 117/55, Temp: 97.7 °F (36.5 °C), Pulse: 65, Resp: 18     Procedures     None    ED Course

## 2018-12-17 NOTE — CARE COORDINATION
Pt is familiar to CM from a previous admission. He is from home with 24h caregivers. Call placed to [facility] [phone] to confirm he is still active with home hospice services. Staff will contact his primary nurse to follow up.     Asif Ruiz, RONALN RN-[facility]  Emergency Department   [phone]

## 2018-12-17 NOTE — H&P
Past Medical History:          Diagnosis Date    BPH (benign prostatic hyperplasia)     Cerebral artery occlusion with cerebral infarction ([address]     TIA    Gout     Hyperlipidemia     Hypertension     PNA (pneumonia)        Past Surgical History:          Procedure Laterality Date    CAROTID ENDARTERECTOMY Bilateral     at least [facility] yrs ago at [facility]       Medications Prior to Admission:      Prior to Admission medications    Medication Sig Start Date End Date Taking? Authorizing Provider   aspirin 81 MG chewable tablet 81 mg by PEG Tube route daily   Yes Historical Provider, MD   nystatin (MYCOSTATIN) 491739 UNIT/GM cream Apply topically 2 times daily Apply topically 2 times daily. Yes Historical Provider, MD   sertraline (ZOLOFT) 50 MG tablet 50 mg by PEG Tube route daily   Yes Historical Provider, MD   Nutritional Supplements (TWOCAL HN PO) Take 1 Can by mouth 3 times daily   Yes Historical Provider, MD   spironolactone-hydrochlorothiazide (ALDACTAZIDE) 25-25 MG per tablet 1 tablet by PEG Tube route daily  4/4/18  Yes Historical Provider, MD   acetaminophen (TYLENOL) 325 MG tablet 2 tablets by PEG Tube route every 4 hours as needed for Pain or Fever 1/17/18  Yes Megan Eisenberg MD   doxazosin (CARDURA) 4 MG tablet 1 tablet by PEG Tube route daily 1/18/18  Yes Megan Eisenberg MD   allopurinol (ZYLOPRIM) 100 MG tablet 100 mg by PEG Tube route daily    Yes Historical Provider, MD   menthol-zinc oxide (CALMOSEPTINE) 0.44-20.625 % OINT ointment Apply topically 2 times daily as needed Max 30 ml per day.     Historical Provider, MD   polyethylene glycol (GLYCOLAX) packet Take 17 g by mouth daily    Historical Provider, MD   ursodiol (ACTIGALL) 300 MG capsule Take 300 mg by mouth daily (before lunch)    Historical Provider, MD   atorvastatin (LIPITOR) 40 MG tablet 1 tablet by PEG Tube route nightly 1/17/18   Megan Eisenberg MD     Allergies:  Ciprofloxacin    Social History:      The patient currently
Reviewed in detail and negative for DM, CAD, Cancer, CVA. Positive as follows:    Family History   Problem Relation Age of Onset    Diabetes Mother        REVIEW OF SYSTEMS: Pertinent positives as noted in the HPI. All other systems reviewed and negative. ROS: Review of Systems    PHYSICAL EXAM PERFORMED:    BP (!) 117/55   Pulse 65   Temp 97.7 °F (36.5 °C) (Oral)   Resp 18   Ht 5' 10\" (1.778 m)   Wt 155 lb (70.3 kg)   SpO2 100%   BMI 22.24 kg/m²     General appearance:  No apparent distress, appears stated age and cooperative. HEENT:  Normal cephalic,atraumatic without obvious deformity. Pupils equal, round, and reactive to light. Extra ocular muscles intact. Conjunctivae/corneas clear. Neck: Supple, with full range of motion. No jugular venous distention. Trachea midline. Respiratory:  Normal respiratory effort. Clear to auscultation, bilaterally without Rales/Wheezes/Rhonchi. Cardiovascular:  Regular rate and rhythm with normal S1/S2 without murmurs, rubs or gallops. Abdomen: Soft, non-tender, non-distended with normal bowel sounds. RUQ painful on deep palpation. LLQ exquisite tenderness on palpation. Musculoskeletal:  No clubbing, cyanosis oredema bilaterally. Full range of motion without deformity. Skin: Skin color, texture, turgor normal.  Norashes or lesions. Neurologic:  Neurovascularly intact without any focal sensory/motor deficits.  Cranialnerves: II-XII intact, grossly non-focal.  Psychiatric:  Alert and oriented, thought content appropriate,normal insight  Capillary Refill: Brisk,< 3 seconds   Peripheral Pulses: +2 palpable, equal bilaterally       Labs:     Recent Labs      12/17/18   1342   WBC  5.0   HGB  7.8*   HCT  23.2*   PLT  103*     Recent Labs      12/17/18   1342   NA  141   K  4.4   CL  102   CO2  30   BUN  43*   CREATININE  1.1   CALCIUM  9.0     Recent Labs      12/17/18   1342   AST  34   ALT  26   BILIDIR  <0.2   BILITOT  0.4   ALKPHOS  144*     Recent Labs

## 2018-12-18 LAB
ALBUMIN SERPL-MCNC: 2.7 G/DL (ref 3.4–5)
ANION GAP SERPL CALCULATED.3IONS-SCNC: 6 MMOL/L (ref 3–16)
BASOPHILS ABSOLUTE: 0 K/UL (ref 0–0.2)
BASOPHILS RELATIVE PERCENT: 0.6 %
BLOOD BANK DISPENSE STATUS: NORMAL
BLOOD BANK PRODUCT CODE: NORMAL
BPU ID: NORMAL
BUN BLDV-MCNC: 35 MG/DL (ref 7–20)
CALCIUM SERPL-MCNC: 8.3 MG/DL (ref 8.3–10.6)
CHLORIDE BLD-SCNC: 111 MMOL/L (ref 99–110)
CO2: 29 MMOL/L (ref 21–32)
CREAT SERPL-MCNC: 1.1 MG/DL (ref 0.8–1.3)
DESCRIPTION BLOOD BANK: NORMAL
EOSINOPHILS ABSOLUTE: 0.3 K/UL (ref 0–0.6)
EOSINOPHILS RELATIVE PERCENT: 6 %
GFR AFRICAN AMERICAN: >60
GFR NON-AFRICAN AMERICAN: >60
GLUCOSE BLD-MCNC: 90 MG/DL (ref 70–99)
HCT VFR BLD CALC: 20.4 % (ref 40.5–52.5)
HCT VFR BLD CALC: 20.9 % (ref 40.5–52.5)
HCT VFR BLD CALC: 24 % (ref 40.5–52.5)
HEMOGLOBIN: 7 G/DL (ref 13.5–17.5)
HEMOGLOBIN: 7.1 G/DL (ref 13.5–17.5)
HEMOGLOBIN: 8.1 G/DL (ref 13.5–17.5)
LYMPHOCYTES ABSOLUTE: 1 K/UL (ref 1–5.1)
LYMPHOCYTES RELATIVE PERCENT: 23.1 %
MACROCYTES: ABNORMAL
MCH RBC QN AUTO: 35 PG (ref 26–34)
MCHC RBC AUTO-ENTMCNC: 34.3 G/DL (ref 31–36)
MCV RBC AUTO: 101.9 FL (ref 80–100)
MONOCYTES ABSOLUTE: 0.3 K/UL (ref 0–1.3)
MONOCYTES RELATIVE PERCENT: 7.3 %
NEUTROPHILS ABSOLUTE: 2.8 K/UL (ref 1.7–7.7)
NEUTROPHILS RELATIVE PERCENT: 63 %
PDW BLD-RTO: 14.7 % (ref 12.4–15.4)
PHOSPHORUS: 3.9 MG/DL (ref 2.5–4.9)
PLATELET # BLD: 87 K/UL (ref 135–450)
PLATELET SLIDE REVIEW: ABNORMAL
PMV BLD AUTO: 8.9 FL (ref 5–10.5)
POLYCHROMASIA: ABNORMAL
POTASSIUM SERPL-SCNC: 3.8 MMOL/L (ref 3.5–5.1)
RBC # BLD: 2 M/UL (ref 4.2–5.9)
SODIUM BLD-SCNC: 146 MMOL/L (ref 136–145)
WBC # BLD: 4.4 K/UL (ref 4–11)

## 2018-12-18 PROCEDURE — 36430 TRANSFUSION BLD/BLD COMPNT: CPT

## 2018-12-18 PROCEDURE — 2580000003 HC RX 258: Performed by: STUDENT IN AN ORGANIZED HEALTH CARE EDUCATION/TRAINING PROGRAM

## 2018-12-18 PROCEDURE — C9113 INJ PANTOPRAZOLE SODIUM, VIA: HCPCS | Performed by: STUDENT IN AN ORGANIZED HEALTH CARE EDUCATION/TRAINING PROGRAM

## 2018-12-18 PROCEDURE — 36415 COLL VENOUS BLD VENIPUNCTURE: CPT

## 2018-12-18 PROCEDURE — 85014 HEMATOCRIT: CPT

## 2018-12-18 PROCEDURE — 80069 RENAL FUNCTION PANEL: CPT

## 2018-12-18 PROCEDURE — 6360000002 HC RX W HCPCS: Performed by: INTERNAL MEDICINE

## 2018-12-18 PROCEDURE — 85018 HEMOGLOBIN: CPT

## 2018-12-18 PROCEDURE — 1200000000 HC SEMI PRIVATE

## 2018-12-18 PROCEDURE — 7100000011 HC PHASE II RECOVERY - ADDTL 15 MIN: Performed by: INTERNAL MEDICINE

## 2018-12-18 PROCEDURE — 99152 MOD SED SAME PHYS/QHP 5/>YRS: CPT | Performed by: INTERNAL MEDICINE

## 2018-12-18 PROCEDURE — P9016 RBC LEUKOCYTES REDUCED: HCPCS

## 2018-12-18 PROCEDURE — 99153 MOD SED SAME PHYS/QHP EA: CPT | Performed by: INTERNAL MEDICINE

## 2018-12-18 PROCEDURE — 7100000010 HC PHASE II RECOVERY - FIRST 15 MIN: Performed by: INTERNAL MEDICINE

## 2018-12-18 PROCEDURE — 2720000010 HC SURG SUPPLY STERILE: Performed by: INTERNAL MEDICINE

## 2018-12-18 PROCEDURE — 0W3P8ZZ CONTROL BLEEDING IN GASTROINTESTINAL TRACT, VIA NATURAL OR ARTIFICIAL OPENING ENDOSCOPIC: ICD-10-PCS | Performed by: INTERNAL MEDICINE

## 2018-12-18 PROCEDURE — 85025 COMPLETE CBC W/AUTO DIFF WBC: CPT

## 2018-12-18 PROCEDURE — 6370000000 HC RX 637 (ALT 250 FOR IP): Performed by: STUDENT IN AN ORGANIZED HEALTH CARE EDUCATION/TRAINING PROGRAM

## 2018-12-18 PROCEDURE — 3609013000 HC EGD TRANSORAL CONTROL BLEEDING ANY METHOD: Performed by: INTERNAL MEDICINE

## 2018-12-18 PROCEDURE — 6360000002 HC RX W HCPCS: Performed by: STUDENT IN AN ORGANIZED HEALTH CARE EDUCATION/TRAINING PROGRAM

## 2018-12-18 PROCEDURE — C9113 INJ PANTOPRAZOLE SODIUM, VIA: HCPCS | Performed by: INTERNAL MEDICINE

## 2018-12-18 PROCEDURE — 2580000003 HC RX 258: Performed by: INTERNAL MEDICINE

## 2018-12-18 RX ORDER — 0.9 % SODIUM CHLORIDE 0.9 %
250 INTRAVENOUS SOLUTION INTRAVENOUS ONCE
Status: COMPLETED | OUTPATIENT
Start: 2018-12-18 | End: 2018-12-19

## 2018-12-18 RX ORDER — FENTANYL CITRATE 50 UG/ML
INJECTION, SOLUTION INTRAMUSCULAR; INTRAVENOUS PRN
Status: DISCONTINUED | OUTPATIENT
Start: 2018-12-18 | End: 2018-12-18 | Stop reason: HOSPADM

## 2018-12-18 RX ORDER — SODIUM CHLORIDE, SODIUM LACTATE, POTASSIUM CHLORIDE, CALCIUM CHLORIDE 600; 310; 30; 20 MG/100ML; MG/100ML; MG/100ML; MG/100ML
INJECTION, SOLUTION INTRAVENOUS CONTINUOUS
Status: DISCONTINUED | OUTPATIENT
Start: 2018-12-18 | End: 2018-12-19

## 2018-12-18 RX ORDER — MIDAZOLAM HYDROCHLORIDE 1 MG/ML
INJECTION INTRAMUSCULAR; INTRAVENOUS PRN
Status: DISCONTINUED | OUTPATIENT
Start: 2018-12-18 | End: 2018-12-18 | Stop reason: HOSPADM

## 2018-12-18 RX ORDER — PANTOPRAZOLE SODIUM 40 MG/10ML
40 INJECTION, POWDER, LYOPHILIZED, FOR SOLUTION INTRAVENOUS 2 TIMES DAILY
Status: DISCONTINUED | OUTPATIENT
Start: 2018-12-18 | End: 2018-12-19

## 2018-12-18 RX ADMIN — SODIUM CHLORIDE 250 ML: 9 INJECTION, SOLUTION INTRAVENOUS at 13:03

## 2018-12-18 RX ADMIN — ATORVASTATIN CALCIUM 40 MG: 40 TABLET, FILM COATED ORAL at 21:02

## 2018-12-18 RX ADMIN — SODIUM CHLORIDE 8 MG/HR: 9 INJECTION, SOLUTION INTRAVENOUS at 02:58

## 2018-12-18 RX ADMIN — PANTOPRAZOLE SODIUM 40 MG: 40 INJECTION, POWDER, FOR SOLUTION INTRAVENOUS at 21:02

## 2018-12-18 RX ADMIN — SODIUM CHLORIDE: 9 INJECTION, SOLUTION INTRAVENOUS at 01:26

## 2018-12-18 RX ADMIN — SODIUM CHLORIDE, POTASSIUM CHLORIDE, SODIUM LACTATE AND CALCIUM CHLORIDE: 600; 310; 30; 20 INJECTION, SOLUTION INTRAVENOUS at 10:35

## 2018-12-18 RX ADMIN — SODIUM CHLORIDE 8 MG/HR: 9 INJECTION, SOLUTION INTRAVENOUS at 14:47

## 2018-12-18 RX ADMIN — Medication 10 ML: at 09:51

## 2018-12-18 ASSESSMENT — PAIN SCALES - GENERAL
PAINLEVEL_OUTOF10: 0

## 2018-12-18 ASSESSMENT — PAIN - FUNCTIONAL ASSESSMENT
PAIN_FUNCTIONAL_ASSESSMENT: 0-10

## 2018-12-19 LAB
ALBUMIN SERPL-MCNC: 2.6 G/DL (ref 3.4–5)
ANION GAP SERPL CALCULATED.3IONS-SCNC: 11 MMOL/L (ref 3–16)
BASOPHILS ABSOLUTE: 0 K/UL (ref 0–0.2)
BASOPHILS RELATIVE PERCENT: 0.4 %
BUN BLDV-MCNC: 30 MG/DL (ref 7–20)
CALCIUM SERPL-MCNC: 8.4 MG/DL (ref 8.3–10.6)
CHLORIDE BLD-SCNC: 111 MMOL/L (ref 99–110)
CO2: 25 MMOL/L (ref 21–32)
CREAT SERPL-MCNC: 1 MG/DL (ref 0.8–1.3)
EOSINOPHILS ABSOLUTE: 0.2 K/UL (ref 0–0.6)
EOSINOPHILS RELATIVE PERCENT: 3.7 %
GFR AFRICAN AMERICAN: >60
GFR NON-AFRICAN AMERICAN: >60
GLUCOSE BLD-MCNC: 80 MG/DL (ref 70–99)
HCT VFR BLD CALC: 24.8 % (ref 40.5–52.5)
HCT VFR BLD CALC: 25.2 % (ref 40.5–52.5)
HCT VFR BLD CALC: 27 % (ref 40.5–52.5)
HCT VFR BLD CALC: 27.4 % (ref 40.5–52.5)
HCT VFR BLD CALC: 27.8 % (ref 40.5–52.5)
HEMOGLOBIN: 8.5 G/DL (ref 13.5–17.5)
HEMOGLOBIN: 8.6 G/DL (ref 13.5–17.5)
HEMOGLOBIN: 9.2 G/DL (ref 13.5–17.5)
HEMOGLOBIN: 9.3 G/DL (ref 13.5–17.5)
HEMOGLOBIN: 9.3 G/DL (ref 13.5–17.5)
LYMPHOCYTES ABSOLUTE: 1 K/UL (ref 1–5.1)
LYMPHOCYTES RELATIVE PERCENT: 15.6 %
MCH RBC QN AUTO: 34.7 PG (ref 26–34)
MCHC RBC AUTO-ENTMCNC: 34.1 G/DL (ref 31–36)
MCV RBC AUTO: 101.8 FL (ref 80–100)
MONOCYTES ABSOLUTE: 0.4 K/UL (ref 0–1.3)
MONOCYTES RELATIVE PERCENT: 6.9 %
NEUTROPHILS ABSOLUTE: 4.6 K/UL (ref 1.7–7.7)
NEUTROPHILS RELATIVE PERCENT: 73.4 %
PDW BLD-RTO: 15.7 % (ref 12.4–15.4)
PHOSPHORUS: 3.7 MG/DL (ref 2.5–4.9)
PLATELET # BLD: 94 K/UL (ref 135–450)
PMV BLD AUTO: 8.9 FL (ref 5–10.5)
POTASSIUM SERPL-SCNC: 3.8 MMOL/L (ref 3.5–5.1)
RBC # BLD: 2.47 M/UL (ref 4.2–5.9)
SODIUM BLD-SCNC: 147 MMOL/L (ref 136–145)
WBC # BLD: 6.2 K/UL (ref 4–11)

## 2018-12-19 PROCEDURE — 85025 COMPLETE CBC W/AUTO DIFF WBC: CPT

## 2018-12-19 PROCEDURE — 6370000000 HC RX 637 (ALT 250 FOR IP): Performed by: STUDENT IN AN ORGANIZED HEALTH CARE EDUCATION/TRAINING PROGRAM

## 2018-12-19 PROCEDURE — C9113 INJ PANTOPRAZOLE SODIUM, VIA: HCPCS | Performed by: INTERNAL MEDICINE

## 2018-12-19 PROCEDURE — 85014 HEMATOCRIT: CPT

## 2018-12-19 PROCEDURE — 6360000002 HC RX W HCPCS: Performed by: INTERNAL MEDICINE

## 2018-12-19 PROCEDURE — 85018 HEMOGLOBIN: CPT

## 2018-12-19 PROCEDURE — 2580000003 HC RX 258: Performed by: STUDENT IN AN ORGANIZED HEALTH CARE EDUCATION/TRAINING PROGRAM

## 2018-12-19 PROCEDURE — 51798 US URINE CAPACITY MEASURE: CPT

## 2018-12-19 PROCEDURE — 1200000000 HC SEMI PRIVATE

## 2018-12-19 PROCEDURE — 36415 COLL VENOUS BLD VENIPUNCTURE: CPT

## 2018-12-19 PROCEDURE — 80069 RENAL FUNCTION PANEL: CPT

## 2018-12-19 RX ORDER — POLYETHYLENE GLYCOL 3350 17 G/17G
17 POWDER, FOR SOLUTION ORAL DAILY
Status: DISCONTINUED | OUTPATIENT
Start: 2018-12-19 | End: 2018-12-20 | Stop reason: HOSPADM

## 2018-12-19 RX ORDER — URSODIOL 300 MG/1
300 CAPSULE ORAL
Status: DISCONTINUED | OUTPATIENT
Start: 2018-12-19 | End: 2018-12-20 | Stop reason: HOSPADM

## 2018-12-19 RX ORDER — PANTOPRAZOLE SODIUM 40 MG/1
40 TABLET, DELAYED RELEASE ORAL
Status: DISCONTINUED | OUTPATIENT
Start: 2018-12-20 | End: 2018-12-20 | Stop reason: HOSPADM

## 2018-12-19 RX ADMIN — ATORVASTATIN CALCIUM 40 MG: 40 TABLET, FILM COATED ORAL at 22:37

## 2018-12-19 RX ADMIN — ALLOPURINOL 100 MG: 100 TABLET ORAL at 09:29

## 2018-12-19 RX ADMIN — Medication 10 ML: at 22:37

## 2018-12-19 RX ADMIN — SERTRALINE HYDROCHLORIDE 50 MG: 50 TABLET ORAL at 09:29

## 2018-12-19 RX ADMIN — PANTOPRAZOLE SODIUM 40 MG: 40 INJECTION, POWDER, FOR SOLUTION INTRAVENOUS at 09:29

## 2018-12-19 RX ADMIN — URSODIOL 300 MG: 300 CAPSULE ORAL at 12:36

## 2018-12-19 ASSESSMENT — PAIN SCALES - GENERAL
PAINLEVEL_OUTOF10: 0

## 2018-12-20 VITALS
OXYGEN SATURATION: 97 % | HEIGHT: 70 IN | WEIGHT: 164.68 LBS | DIASTOLIC BLOOD PRESSURE: 54 MMHG | HEART RATE: 60 BPM | RESPIRATION RATE: 18 BRPM | BODY MASS INDEX: 23.58 KG/M2 | SYSTOLIC BLOOD PRESSURE: 113 MMHG | TEMPERATURE: 97.1 F

## 2018-12-20 LAB
ALBUMIN SERPL-MCNC: 2.6 G/DL (ref 3.4–5)
ANION GAP SERPL CALCULATED.3IONS-SCNC: 9 MMOL/L (ref 3–16)
BASOPHILS ABSOLUTE: 0 K/UL (ref 0–0.2)
BASOPHILS RELATIVE PERCENT: 0.3 %
BUN BLDV-MCNC: 33 MG/DL (ref 7–20)
CALCIUM SERPL-MCNC: 8.2 MG/DL (ref 8.3–10.6)
CHLORIDE BLD-SCNC: 111 MMOL/L (ref 99–110)
CO2: 26 MMOL/L (ref 21–32)
CREAT SERPL-MCNC: 1.1 MG/DL (ref 0.8–1.3)
EOSINOPHILS ABSOLUTE: 0.4 K/UL (ref 0–0.6)
EOSINOPHILS RELATIVE PERCENT: 4.6 %
GFR AFRICAN AMERICAN: >60
GFR NON-AFRICAN AMERICAN: >60
GLUCOSE BLD-MCNC: 98 MG/DL (ref 70–99)
HCT VFR BLD CALC: 27.3 % (ref 40.5–52.5)
HCT VFR BLD CALC: 29.7 % (ref 40.5–52.5)
HEMOGLOBIN: 10 G/DL (ref 13.5–17.5)
HEMOGLOBIN: 9.3 G/DL (ref 13.5–17.5)
LYMPHOCYTES ABSOLUTE: 1.4 K/UL (ref 1–5.1)
LYMPHOCYTES RELATIVE PERCENT: 16.8 %
MCH RBC QN AUTO: 34.2 PG (ref 26–34)
MCHC RBC AUTO-ENTMCNC: 34.1 G/DL (ref 31–36)
MCV RBC AUTO: 100.3 FL (ref 80–100)
MONOCYTES ABSOLUTE: 0.6 K/UL (ref 0–1.3)
MONOCYTES RELATIVE PERCENT: 6.9 %
NEUTROPHILS ABSOLUTE: 5.9 K/UL (ref 1.7–7.7)
NEUTROPHILS RELATIVE PERCENT: 71.4 %
PDW BLD-RTO: 15.8 % (ref 12.4–15.4)
PHOSPHORUS: 3.3 MG/DL (ref 2.5–4.9)
PLATELET # BLD: 124 K/UL (ref 135–450)
PMV BLD AUTO: 9.3 FL (ref 5–10.5)
POTASSIUM SERPL-SCNC: 3.6 MMOL/L (ref 3.5–5.1)
RBC # BLD: 2.72 M/UL (ref 4.2–5.9)
SODIUM BLD-SCNC: 146 MMOL/L (ref 136–145)
WBC # BLD: 8.3 K/UL (ref 4–11)

## 2018-12-20 PROCEDURE — 36415 COLL VENOUS BLD VENIPUNCTURE: CPT

## 2018-12-20 PROCEDURE — 6370000000 HC RX 637 (ALT 250 FOR IP): Performed by: INTERNAL MEDICINE

## 2018-12-20 PROCEDURE — 2580000003 HC RX 258: Performed by: STUDENT IN AN ORGANIZED HEALTH CARE EDUCATION/TRAINING PROGRAM

## 2018-12-20 PROCEDURE — 85014 HEMATOCRIT: CPT

## 2018-12-20 PROCEDURE — 80069 RENAL FUNCTION PANEL: CPT

## 2018-12-20 PROCEDURE — 85018 HEMOGLOBIN: CPT

## 2018-12-20 PROCEDURE — 6370000000 HC RX 637 (ALT 250 FOR IP): Performed by: STUDENT IN AN ORGANIZED HEALTH CARE EDUCATION/TRAINING PROGRAM

## 2018-12-20 PROCEDURE — 85025 COMPLETE CBC W/AUTO DIFF WBC: CPT

## 2018-12-20 RX ORDER — PANTOPRAZOLE SODIUM 40 MG/1
40 TABLET, DELAYED RELEASE ORAL 2 TIMES DAILY
Qty: 30 TABLET | Refills: 3 | Status: SHIPPED | OUTPATIENT
Start: 2018-12-20

## 2018-12-20 RX ORDER — DOXAZOSIN MESYLATE 4 MG/1
4 TABLET ORAL DAILY
Qty: 30 TABLET | Refills: 3 | Status: SHIPPED | OUTPATIENT
Start: 2018-12-20 | End: 2018-12-20 | Stop reason: HOSPADM

## 2018-12-20 RX ORDER — URSODIOL 300 MG/1
300 CAPSULE ORAL
Qty: 30 CAPSULE | Refills: 3 | Status: SHIPPED | OUTPATIENT
Start: 2018-12-20 | End: 2019-01-21 | Stop reason: CLARIF

## 2018-12-20 RX ORDER — PANTOPRAZOLE SODIUM 40 MG/1
40 TABLET, DELAYED RELEASE ORAL
Qty: 30 TABLET | Refills: 3 | Status: SHIPPED | OUTPATIENT
Start: 2018-12-21 | End: 2018-12-20

## 2018-12-20 RX ADMIN — POLYETHYLENE GLYCOL (3350) 17 G: 17 POWDER, FOR SOLUTION ORAL at 09:30

## 2018-12-20 RX ADMIN — Medication 10 ML: at 09:42

## 2018-12-20 RX ADMIN — SERTRALINE HYDROCHLORIDE 50 MG: 50 TABLET ORAL at 09:30

## 2018-12-20 RX ADMIN — URSODIOL 300 MG: 300 CAPSULE ORAL at 12:57

## 2018-12-20 RX ADMIN — ALLOPURINOL 100 MG: 100 TABLET ORAL at 09:29

## 2018-12-20 RX ADMIN — PANTOPRAZOLE SODIUM 40 MG: 40 TABLET, DELAYED RELEASE ORAL at 06:41

## 2018-12-20 ASSESSMENT — PAIN SCALES - GENERAL
PAINLEVEL_OUTOF10: 0

## 2018-12-20 NOTE — PLAN OF CARE
Problem: Falls - Risk of:  Goal: Will remain free from falls  Will remain free from falls   . Patient at risk for falls. Patient resting quietly in bed. Side rails up x 3/4. Bed locked in lowest position. Bed alarm on. Bedside table and call light within reach. Patient instructed to call for assistance. Patient verbalized understanding. Will continue to monitor. Nataliya Orr         Problem: Fluid Volume - Imbalance:  Goal: Absence of imbalanced fluid volume signs and symptoms  Absence of imbalanced fluid volume signs and symptoms   No signs or symptoms of fluid imbalance. Will continue to monitor. Nataliya Orr

## 2018-12-22 ENCOUNTER — HOSPITAL ENCOUNTER (EMERGENCY)
Age: 83
Discharge: HOME | End: 2018-12-22
Attending: EMERGENCY MEDICINE
Payer: MEDICARE

## 2018-12-22 VITALS
HEART RATE: 78 BPM | RESPIRATION RATE: 18 BRPM | TEMPERATURE: 98 F | OXYGEN SATURATION: 98 % | SYSTOLIC BLOOD PRESSURE: 128 MMHG | DIASTOLIC BLOOD PRESSURE: 66 MMHG

## 2018-12-22 DIAGNOSIS — K94.23 GASTROSTOMY TUBE DYSFUNCTION (HCC): Primary | ICD-10-CM

## 2018-12-22 PROCEDURE — 4500000022 HC ED LEVEL 2 PROCEDURE

## 2018-12-22 PROCEDURE — 99282 EMERGENCY DEPT VISIT SF MDM: CPT

## 2018-12-22 ASSESSMENT — ENCOUNTER SYMPTOMS
VOMITING: 0
DIARRHEA: 0
EYES NEGATIVE: 1
SHORTNESS OF BREATH: 0
CHEST TIGHTNESS: 0
NAUSEA: 0
ABDOMINAL PAIN: 0

## 2018-12-22 NOTE — ED PROVIDER NOTES
[facility] [place]  EMERGENCY DEPARTMENT ENCOUNTER          NURSE PRACTITIONER NOTE     Date of evaluation: 12/22/2018    Chief Complaint     G Tube Complications      History of Present Illness     Pedro Boggs is a 80 y.o. 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 Lasted use this morning without difficulty. No pain    With the exception of the above, there are no aggravating or alleviating factors. Review of Systems     Review of Systems   Constitutional: Negative for chills, fatigue and fever. HENT: Negative. Eyes: Negative. Respiratory: Negative for chest tightness and shortness of breath. Cardiovascular: Negative for chest pain and palpitations. Gastrointestinal: Negative for abdominal pain, diarrhea, nausea and vomiting. Endocrine: Negative. Genitourinary: Negative for dysuria, frequency, hematuria and urgency. Musculoskeletal: Negative for arthralgias and myalgias. Skin: Negative for rash and wound. Neurological: Negative for dizziness, weakness, light-headedness, numbness and headaches. Hematological: Negative. Psychiatric/Behavioral: Negative for hallucinations and suicidal ideas. Past Medical, Surgical, Family, and Social History     Medical History:   Past Medical History:   Diagnosis Date    BPH (benign prostatic hyperplasia)     Cerebral artery occlusion with cerebral infarction ([facility]     TIA    Gout     Hyperlipidemia     Hypertension     PNA (pneumonia)        Surgical History:   Past Surgical History:   Procedure Laterality Date    CAROTID ENDARTERECTOMY Bilateral     at least 20 yrs ago at [facility] 12/18/2018    EGD CONTROL HEMORRHAGE performed by Tariq Newell MD at [address] History: He reports that he has quit smoking. His smoking use included Cigarettes.  He has never used smokeless tobacco. He

## 2019-01-01 ENCOUNTER — HOSPITAL ENCOUNTER (EMERGENCY)
Age: 84
Discharge: HOME | End: 2019-01-01
Attending: EMERGENCY MEDICINE
Payer: MEDICARE

## 2019-01-01 VITALS
DIASTOLIC BLOOD PRESSURE: 58 MMHG | OXYGEN SATURATION: 99 % | RESPIRATION RATE: 15 BRPM | SYSTOLIC BLOOD PRESSURE: 127 MMHG | TEMPERATURE: 97.7 F | HEART RATE: 55 BPM

## 2019-01-01 DIAGNOSIS — K94.23 GASTROSTOMY TUBE DYSFUNCTION (HCC): Primary | ICD-10-CM

## 2019-01-01 PROCEDURE — 4500000022 HC ED LEVEL 2 PROCEDURE

## 2019-01-01 PROCEDURE — 99282 EMERGENCY DEPT VISIT SF MDM: CPT

## 2019-01-01 ASSESSMENT — PAIN SCALES - GENERAL: PAINLEVEL_OUTOF10: 7

## 2019-01-08 ENCOUNTER — HOSPITAL ENCOUNTER (EMERGENCY)
Age: 84
Discharge: HOME | End: 2019-01-08
Attending: EMERGENCY MEDICINE
Payer: COMMERCIAL

## 2019-01-08 VITALS
RESPIRATION RATE: 19 BRPM | OXYGEN SATURATION: 99 % | SYSTOLIC BLOOD PRESSURE: 112 MMHG | TEMPERATURE: 98 F | DIASTOLIC BLOOD PRESSURE: 63 MMHG | HEART RATE: 64 BPM

## 2019-01-08 DIAGNOSIS — K94.23 GASTROSTOMY TUBE DYSFUNCTION (HCC): Primary | ICD-10-CM

## 2019-01-08 PROCEDURE — 4500000022 HC ED LEVEL 2 PROCEDURE

## 2019-01-08 PROCEDURE — 99282 EMERGENCY DEPT VISIT SF MDM: CPT

## 2019-01-12 ENCOUNTER — HOSPITAL ENCOUNTER (EMERGENCY)
Age: 84
Discharge: HOME | End: 2019-01-12
Attending: EMERGENCY MEDICINE
Payer: MEDICARE

## 2019-01-12 ENCOUNTER — APPOINTMENT (OUTPATIENT)
Dept: GENERAL RADIOLOGY | Age: 84
End: 2019-01-12
Payer: MEDICARE

## 2019-01-12 VITALS
DIASTOLIC BLOOD PRESSURE: 58 MMHG | HEIGHT: 70 IN | BODY MASS INDEX: 22.19 KG/M2 | WEIGHT: 155 LBS | OXYGEN SATURATION: 100 % | RESPIRATION RATE: 18 BRPM | HEART RATE: 52 BPM | SYSTOLIC BLOOD PRESSURE: 129 MMHG | TEMPERATURE: 97.6 F

## 2019-01-12 DIAGNOSIS — K94.23 GASTROSTOMY TUBE DYSFUNCTION (HCC): Primary | ICD-10-CM

## 2019-01-12 PROCEDURE — 4500000023 HC ED LEVEL 3 PROCEDURE

## 2019-01-12 PROCEDURE — 74018 RADEX ABDOMEN 1 VIEW: CPT

## 2019-01-12 PROCEDURE — 99283 EMERGENCY DEPT VISIT LOW MDM: CPT

## 2019-01-17 ENCOUNTER — APPOINTMENT (OUTPATIENT)
Dept: GENERAL RADIOLOGY | Age: 84
End: 2019-01-17
Payer: MEDICARE

## 2019-01-17 ENCOUNTER — HOSPITAL ENCOUNTER (EMERGENCY)
Age: 84
Discharge: HOME | End: 2019-01-17
Attending: EMERGENCY MEDICINE
Payer: MEDICARE

## 2019-01-17 VITALS
HEART RATE: 70 BPM | SYSTOLIC BLOOD PRESSURE: 109 MMHG | RESPIRATION RATE: 14 BRPM | OXYGEN SATURATION: 97 % | DIASTOLIC BLOOD PRESSURE: 76 MMHG | TEMPERATURE: 98.3 F

## 2019-01-17 DIAGNOSIS — Z43.1 ATTENTION TO G-TUBE (HCC): Primary | ICD-10-CM

## 2019-01-17 PROCEDURE — 99284 EMERGENCY DEPT VISIT MOD MDM: CPT

## 2019-01-17 PROCEDURE — 74018 RADEX ABDOMEN 1 VIEW: CPT

## 2019-01-17 PROCEDURE — 4500000024 HC ED LEVEL 4 PROCEDURE

## 2019-01-21 ENCOUNTER — HOSPITAL ENCOUNTER (INPATIENT)
Age: 84
LOS: 2 days | Discharge: HOSPICE | DRG: 920 | End: 2019-01-23
Attending: EMERGENCY MEDICINE | Admitting: INTERNAL MEDICINE
Payer: MEDICARE

## 2019-01-21 PROBLEM — K94.23 PEG TUBE MALFUNCTION (HCC): Status: ACTIVE | Noted: 2019-01-21

## 2019-01-21 LAB
ANION GAP SERPL CALCULATED.3IONS-SCNC: 10 MMOL/L (ref 3–16)
BASOPHILS ABSOLUTE: 0.1 K/UL (ref 0–0.2)
BASOPHILS RELATIVE PERCENT: 1.7 %
BUN BLDV-MCNC: 23 MG/DL (ref 7–20)
CALCIUM SERPL-MCNC: 8.8 MG/DL (ref 8.3–10.6)
CHLORIDE BLD-SCNC: 102 MMOL/L (ref 99–110)
CO2: 32 MMOL/L (ref 21–32)
CREAT SERPL-MCNC: 1 MG/DL (ref 0.8–1.3)
EOSINOPHILS ABSOLUTE: 0.2 K/UL (ref 0–0.6)
EOSINOPHILS RELATIVE PERCENT: 4.5 %
GFR AFRICAN AMERICAN: >60
GFR NON-AFRICAN AMERICAN: >60
GLUCOSE BLD-MCNC: 105 MG/DL (ref 70–99)
HCT VFR BLD CALC: 29.6 % (ref 40.5–52.5)
HEMOGLOBIN: 10.2 G/DL (ref 13.5–17.5)
LYMPHOCYTES ABSOLUTE: 1 K/UL (ref 1–5.1)
LYMPHOCYTES RELATIVE PERCENT: 20 %
MCH RBC QN AUTO: 33.8 PG (ref 26–34)
MCHC RBC AUTO-ENTMCNC: 34.5 G/DL (ref 31–36)
MCV RBC AUTO: 98 FL (ref 80–100)
MONOCYTES ABSOLUTE: 0.3 K/UL (ref 0–1.3)
MONOCYTES RELATIVE PERCENT: 5.9 %
NEUTROPHILS ABSOLUTE: 3.4 K/UL (ref 1.7–7.7)
NEUTROPHILS RELATIVE PERCENT: 67.9 %
PDW BLD-RTO: 13.9 % (ref 12.4–15.4)
PLATELET # BLD: 111 K/UL (ref 135–450)
PMV BLD AUTO: 9.3 FL (ref 5–10.5)
POTASSIUM SERPL-SCNC: 4.5 MMOL/L (ref 3.5–5.1)
RBC # BLD: 3.02 M/UL (ref 4.2–5.9)
SODIUM BLD-SCNC: 144 MMOL/L (ref 136–145)
WBC # BLD: 5 K/UL (ref 4–11)

## 2019-01-21 PROCEDURE — 85025 COMPLETE CBC W/AUTO DIFF WBC: CPT

## 2019-01-21 PROCEDURE — 2500000003 HC RX 250 WO HCPCS: Performed by: NURSE PRACTITIONER

## 2019-01-21 PROCEDURE — 99284 EMERGENCY DEPT VISIT MOD MDM: CPT

## 2019-01-21 PROCEDURE — 6360000002 HC RX W HCPCS: Performed by: NURSE PRACTITIONER

## 2019-01-21 PROCEDURE — 80048 BASIC METABOLIC PNL TOTAL CA: CPT

## 2019-01-21 PROCEDURE — 1200000000 HC SEMI PRIVATE

## 2019-01-21 RX ORDER — ONDANSETRON 2 MG/ML
4 INJECTION INTRAMUSCULAR; INTRAVENOUS EVERY 6 HOURS PRN
Status: DISCONTINUED | OUTPATIENT
Start: 2019-01-21 | End: 2019-01-23 | Stop reason: HOSPADM

## 2019-01-21 RX ORDER — FENTANYL CITRATE 50 UG/ML
25 INJECTION, SOLUTION INTRAMUSCULAR; INTRAVENOUS ONCE
Status: COMPLETED | OUTPATIENT
Start: 2019-01-21 | End: 2019-01-21

## 2019-01-21 RX ORDER — SODIUM CHLORIDE 0.9 % (FLUSH) 0.9 %
10 SYRINGE (ML) INJECTION PRN
Status: DISCONTINUED | OUTPATIENT
Start: 2019-01-21 | End: 2019-01-23 | Stop reason: HOSPADM

## 2019-01-21 RX ORDER — SODIUM CHLORIDE 0.9 % (FLUSH) 0.9 %
10 SYRINGE (ML) INJECTION EVERY 12 HOURS SCHEDULED
Status: DISCONTINUED | OUTPATIENT
Start: 2019-01-21 | End: 2019-01-23 | Stop reason: HOSPADM

## 2019-01-21 RX ORDER — TAMSULOSIN HYDROCHLORIDE 0.4 MG/1
0.4 CAPSULE ORAL DAILY
Status: DISCONTINUED | OUTPATIENT
Start: 2019-01-22 | End: 2019-01-23 | Stop reason: HOSPADM

## 2019-01-21 RX ORDER — ALLOPURINOL 100 MG/1
100 TABLET ORAL DAILY
Status: DISCONTINUED | OUTPATIENT
Start: 2019-01-22 | End: 2019-01-23 | Stop reason: HOSPADM

## 2019-01-21 RX ORDER — LIDOCAINE HYDROCHLORIDE AND EPINEPHRINE 10; 10 MG/ML; UG/ML
20 INJECTION, SOLUTION INFILTRATION; PERINEURAL ONCE
Status: COMPLETED | OUTPATIENT
Start: 2019-01-21 | End: 2019-01-21

## 2019-01-21 RX ORDER — NYSTATIN 100000 U/G
CREAM TOPICAL 2 TIMES DAILY
Status: DISCONTINUED | OUTPATIENT
Start: 2019-01-21 | End: 2019-01-23 | Stop reason: HOSPADM

## 2019-01-21 RX ORDER — PANTOPRAZOLE SODIUM 40 MG/1
40 TABLET, DELAYED RELEASE ORAL
Status: DISCONTINUED | OUTPATIENT
Start: 2019-01-22 | End: 2019-01-22

## 2019-01-21 RX ORDER — TAMSULOSIN HYDROCHLORIDE 0.4 MG/1
0.4 CAPSULE ORAL DAILY
COMMUNITY

## 2019-01-21 RX ORDER — URSODIOL 300 MG/1
300 CAPSULE ORAL
Status: DISCONTINUED | OUTPATIENT
Start: 2019-01-22 | End: 2019-01-23 | Stop reason: HOSPADM

## 2019-01-21 RX ORDER — ATORVASTATIN CALCIUM 40 MG/1
40 TABLET, FILM COATED ORAL NIGHTLY
Status: DISCONTINUED | OUTPATIENT
Start: 2019-01-21 | End: 2019-01-23 | Stop reason: HOSPADM

## 2019-01-21 RX ORDER — SODIUM CHLORIDE 9 MG/ML
INJECTION, SOLUTION INTRAVENOUS CONTINUOUS
Status: DISCONTINUED | OUTPATIENT
Start: 2019-01-21 | End: 2019-01-22

## 2019-01-21 RX ORDER — ACETAMINOPHEN 325 MG/1
650 TABLET ORAL EVERY 6 HOURS PRN
Status: DISCONTINUED | OUTPATIENT
Start: 2019-01-21 | End: 2019-01-23 | Stop reason: HOSPADM

## 2019-01-21 RX ADMIN — FENTANYL CITRATE 25 MCG: 50 INJECTION INTRAMUSCULAR; INTRAVENOUS at 21:21

## 2019-01-21 RX ADMIN — LIDOCAINE HYDROCHLORIDE,EPINEPHRINE BITARTRATE 20 ML: 10; .01 INJECTION, SOLUTION INFILTRATION; PERINEURAL at 21:21

## 2019-01-21 ASSESSMENT — ENCOUNTER SYMPTOMS
SHORTNESS OF BREATH: 0
EYES NEGATIVE: 1
VOMITING: 0
NAUSEA: 0
ABDOMINAL PAIN: 0
DIARRHEA: 0
CHEST TIGHTNESS: 0

## 2019-01-21 ASSESSMENT — PAIN SCALES - GENERAL
PAINLEVEL_OUTOF10: 4
PAINLEVEL_OUTOF10: 0

## 2019-01-22 ENCOUNTER — ANESTHESIA (OUTPATIENT)
Dept: ENDOSCOPY | Age: 84
DRG: 920 | End: 2019-01-22
Payer: MEDICARE

## 2019-01-22 ENCOUNTER — ANESTHESIA EVENT (OUTPATIENT)
Dept: ENDOSCOPY | Age: 84
DRG: 920 | End: 2019-01-22
Payer: MEDICARE

## 2019-01-22 VITALS — SYSTOLIC BLOOD PRESSURE: 121 MMHG | DIASTOLIC BLOOD PRESSURE: 67 MMHG | OXYGEN SATURATION: 93 %

## 2019-01-22 LAB
ALBUMIN SERPL-MCNC: 2.9 G/DL (ref 3.4–5)
ALP BLD-CCNC: 133 U/L (ref 40–129)
ALT SERPL-CCNC: 13 U/L (ref 10–40)
ANION GAP SERPL CALCULATED.3IONS-SCNC: 8 MMOL/L (ref 3–16)
APTT: 31.2 SEC (ref 26–36)
AST SERPL-CCNC: 18 U/L (ref 15–37)
BILIRUB SERPL-MCNC: 0.4 MG/DL (ref 0–1)
BILIRUBIN DIRECT: <0.2 MG/DL (ref 0–0.3)
BILIRUBIN, INDIRECT: ABNORMAL MG/DL (ref 0–1)
BUN BLDV-MCNC: 21 MG/DL (ref 7–20)
CALCIUM SERPL-MCNC: 9.1 MG/DL (ref 8.3–10.6)
CHLORIDE BLD-SCNC: 104 MMOL/L (ref 99–110)
CO2: 30 MMOL/L (ref 21–32)
CREAT SERPL-MCNC: 1 MG/DL (ref 0.8–1.3)
GFR AFRICAN AMERICAN: >60
GFR NON-AFRICAN AMERICAN: >60
GLUCOSE BLD-MCNC: 87 MG/DL (ref 70–99)
INR BLD: 1.08 (ref 0.86–1.14)
POTASSIUM REFLEX MAGNESIUM: 3.8 MMOL/L (ref 3.5–5.1)
PROTHROMBIN TIME: 12.3 SEC (ref 9.8–13)
SODIUM BLD-SCNC: 142 MMOL/L (ref 136–145)
TOTAL PROTEIN: 5.1 G/DL (ref 6.4–8.2)

## 2019-01-22 PROCEDURE — 36415 COLL VENOUS BLD VENIPUNCTURE: CPT

## 2019-01-22 PROCEDURE — 96376 TX/PRO/DX INJ SAME DRUG ADON: CPT

## 2019-01-22 PROCEDURE — 88305 TISSUE EXAM BY PATHOLOGIST: CPT

## 2019-01-22 PROCEDURE — 85730 THROMBOPLASTIN TIME PARTIAL: CPT

## 2019-01-22 PROCEDURE — 2709999900 HC NON-CHARGEABLE SUPPLY: Performed by: INTERNAL MEDICINE

## 2019-01-22 PROCEDURE — 96372 THER/PROPH/DIAG INJ SC/IM: CPT

## 2019-01-22 PROCEDURE — 6360000002 HC RX W HCPCS: Performed by: INTERNAL MEDICINE

## 2019-01-22 PROCEDURE — 6370000000 HC RX 637 (ALT 250 FOR IP): Performed by: INTERNAL MEDICINE

## 2019-01-22 PROCEDURE — 2580000003 HC RX 258: Performed by: INTERNAL MEDICINE

## 2019-01-22 PROCEDURE — 88342 IMHCHEM/IMCYTCHM 1ST ANTB: CPT

## 2019-01-22 PROCEDURE — 0DB68ZX EXCISION OF STOMACH, VIA NATURAL OR ARTIFICIAL OPENING ENDOSCOPIC, DIAGNOSTIC: ICD-10-PCS | Performed by: INTERNAL MEDICINE

## 2019-01-22 PROCEDURE — 3700000000 HC ANESTHESIA ATTENDED CARE: Performed by: INTERNAL MEDICINE

## 2019-01-22 PROCEDURE — 80076 HEPATIC FUNCTION PANEL: CPT

## 2019-01-22 PROCEDURE — 80048 BASIC METABOLIC PNL TOTAL CA: CPT

## 2019-01-22 PROCEDURE — 6360000002 HC RX W HCPCS: Performed by: NURSE ANESTHETIST, CERTIFIED REGISTERED

## 2019-01-22 PROCEDURE — 1200000000 HC SEMI PRIVATE

## 2019-01-22 PROCEDURE — 3609012400 HC EGD TRANSORAL BIOPSY SINGLE/MULTIPLE: Performed by: INTERNAL MEDICINE

## 2019-01-22 PROCEDURE — 7100000011 HC PHASE II RECOVERY - ADDTL 15 MIN: Performed by: INTERNAL MEDICINE

## 2019-01-22 PROCEDURE — 96375 TX/PRO/DX INJ NEW DRUG ADDON: CPT

## 2019-01-22 PROCEDURE — 0DH68UZ INSERTION OF FEEDING DEVICE INTO STOMACH, VIA NATURAL OR ARTIFICIAL OPENING ENDOSCOPIC: ICD-10-PCS | Performed by: INTERNAL MEDICINE

## 2019-01-22 PROCEDURE — C9113 INJ PANTOPRAZOLE SODIUM, VIA: HCPCS | Performed by: INTERNAL MEDICINE

## 2019-01-22 PROCEDURE — 3609013300 HC EGD TUBE PLACEMENT: Performed by: INTERNAL MEDICINE

## 2019-01-22 PROCEDURE — 2500000003 HC RX 250 WO HCPCS: Performed by: NURSE ANESTHETIST, CERTIFIED REGISTERED

## 2019-01-22 PROCEDURE — 7100000010 HC PHASE II RECOVERY - FIRST 15 MIN: Performed by: INTERNAL MEDICINE

## 2019-01-22 PROCEDURE — 3700000001 HC ADD 15 MINUTES (ANESTHESIA): Performed by: INTERNAL MEDICINE

## 2019-01-22 PROCEDURE — 85610 PROTHROMBIN TIME: CPT

## 2019-01-22 RX ORDER — SODIUM CHLORIDE 9 MG/ML
INJECTION, SOLUTION INTRAVENOUS CONTINUOUS
Status: ACTIVE | OUTPATIENT
Start: 2019-01-22 | End: 2019-01-22

## 2019-01-22 RX ORDER — PROMETHAZINE HYDROCHLORIDE 25 MG/ML
6.25 INJECTION, SOLUTION INTRAMUSCULAR; INTRAVENOUS
Status: DISCONTINUED | OUTPATIENT
Start: 2019-01-22 | End: 2019-01-22 | Stop reason: HOSPADM

## 2019-01-22 RX ORDER — DIPHENHYDRAMINE HYDROCHLORIDE 50 MG/ML
12.5 INJECTION INTRAMUSCULAR; INTRAVENOUS
Status: DISCONTINUED | OUTPATIENT
Start: 2019-01-22 | End: 2019-01-22 | Stop reason: HOSPADM

## 2019-01-22 RX ORDER — MEPERIDINE HYDROCHLORIDE 25 MG/ML
12.5 INJECTION INTRAMUSCULAR; INTRAVENOUS; SUBCUTANEOUS EVERY 5 MIN PRN
Status: DISCONTINUED | OUTPATIENT
Start: 2019-01-22 | End: 2019-01-22 | Stop reason: HOSPADM

## 2019-01-22 RX ORDER — HYDRALAZINE HYDROCHLORIDE 20 MG/ML
5 INJECTION INTRAMUSCULAR; INTRAVENOUS EVERY 10 MIN PRN
Status: DISCONTINUED | OUTPATIENT
Start: 2019-01-22 | End: 2019-01-22 | Stop reason: HOSPADM

## 2019-01-22 RX ORDER — PROPOFOL 10 MG/ML
INJECTION, EMULSION INTRAVENOUS PRN
Status: DISCONTINUED | OUTPATIENT
Start: 2019-01-22 | End: 2019-01-22 | Stop reason: SDUPTHER

## 2019-01-22 RX ORDER — MORPHINE SULFATE 4 MG/ML
1 INJECTION, SOLUTION INTRAMUSCULAR; INTRAVENOUS EVERY 5 MIN PRN
Status: DISCONTINUED | OUTPATIENT
Start: 2019-01-22 | End: 2019-01-22 | Stop reason: HOSPADM

## 2019-01-22 RX ORDER — OXYCODONE HYDROCHLORIDE 5 MG/1
5 TABLET ORAL PRN
Status: DISCONTINUED | OUTPATIENT
Start: 2019-01-22 | End: 2019-01-22 | Stop reason: HOSPADM

## 2019-01-22 RX ORDER — LABETALOL HYDROCHLORIDE 5 MG/ML
5 INJECTION, SOLUTION INTRAVENOUS EVERY 10 MIN PRN
Status: DISCONTINUED | OUTPATIENT
Start: 2019-01-22 | End: 2019-01-22 | Stop reason: HOSPADM

## 2019-01-22 RX ORDER — METOCLOPRAMIDE HYDROCHLORIDE 5 MG/ML
10 INJECTION INTRAMUSCULAR; INTRAVENOUS
Status: DISCONTINUED | OUTPATIENT
Start: 2019-01-22 | End: 2019-01-22 | Stop reason: HOSPADM

## 2019-01-22 RX ORDER — OXYCODONE HYDROCHLORIDE 5 MG/1
10 TABLET ORAL PRN
Status: DISCONTINUED | OUTPATIENT
Start: 2019-01-22 | End: 2019-01-22 | Stop reason: HOSPADM

## 2019-01-22 RX ORDER — PANTOPRAZOLE SODIUM 40 MG/10ML
40 INJECTION, POWDER, LYOPHILIZED, FOR SOLUTION INTRAVENOUS 2 TIMES DAILY
Status: DISCONTINUED | OUTPATIENT
Start: 2019-01-22 | End: 2019-01-23 | Stop reason: HOSPADM

## 2019-01-22 RX ORDER — LIDOCAINE HYDROCHLORIDE 20 MG/ML
INJECTION, SOLUTION INFILTRATION; PERINEURAL PRN
Status: DISCONTINUED | OUTPATIENT
Start: 2019-01-22 | End: 2019-01-22 | Stop reason: SDUPTHER

## 2019-01-22 RX ADMIN — NYSTATIN: 100000 CREAM TOPICAL at 13:22

## 2019-01-22 RX ADMIN — SODIUM CHLORIDE: 9 INJECTION, SOLUTION INTRAVENOUS at 13:24

## 2019-01-22 RX ADMIN — ENOXAPARIN SODIUM 40 MG: 40 INJECTION SUBCUTANEOUS at 13:21

## 2019-01-22 RX ADMIN — ACETAMINOPHEN 650 MG: 325 TABLET, FILM COATED ORAL at 21:02

## 2019-01-22 RX ADMIN — PANTOPRAZOLE SODIUM 40 MG: 40 INJECTION, POWDER, FOR SOLUTION INTRAVENOUS at 20:58

## 2019-01-22 RX ADMIN — Medication 10 ML: at 00:09

## 2019-01-22 RX ADMIN — PROPOFOL 10 MG: 10 INJECTION, EMULSION INTRAVENOUS at 09:19

## 2019-01-22 RX ADMIN — CEFAZOLIN 1 G: 1 INJECTION, POWDER, FOR SOLUTION INTRAMUSCULAR; INTRAVENOUS at 09:12

## 2019-01-22 RX ADMIN — PROPOFOL 25 MG: 10 INJECTION, EMULSION INTRAVENOUS at 09:16

## 2019-01-22 RX ADMIN — PANTOPRAZOLE SODIUM 40 MG: 40 INJECTION, POWDER, FOR SOLUTION INTRAVENOUS at 13:21

## 2019-01-22 RX ADMIN — SODIUM CHLORIDE: 9 INJECTION, SOLUTION INTRAVENOUS at 00:09

## 2019-01-22 RX ADMIN — LIDOCAINE HYDROCHLORIDE 100 MG: 20 INJECTION, SOLUTION INFILTRATION; PERINEURAL at 09:15

## 2019-01-22 RX ADMIN — PANTOPRAZOLE SODIUM 40 MG: 40 INJECTION, POWDER, FOR SOLUTION INTRAVENOUS at 03:31

## 2019-01-22 RX ADMIN — Medication 10 ML: at 20:58

## 2019-01-22 RX ADMIN — NYSTATIN: 100000 CREAM TOPICAL at 00:08

## 2019-01-22 ASSESSMENT — PULMONARY FUNCTION TESTS
PIF_VALUE: 1

## 2019-01-22 ASSESSMENT — PAIN SCALES - GENERAL
PAINLEVEL_OUTOF10: 0
PAINLEVEL_OUTOF10: 0
PAINLEVEL_OUTOF10: 2

## 2019-01-22 ASSESSMENT — ENCOUNTER SYMPTOMS: SHORTNESS OF BREATH: 1

## 2019-01-22 NOTE — PLAN OF CARE
Problem: Nutrition  Goal: Optimal nutrition therapy  Outcome: Ongoing  Nutrition Problem: Inadequate oral intake  Intervention: Food and/or Nutrient Delivery: Start Tube Feeding  Nutritional Goals: pt will tolerate TF to provide 100% of needs via PEG

## 2019-01-22 NOTE — PROGRESS NOTES
Nutrition Assessment    Type and Reason for Visit: Initial, Positive Nutrition Screen    Nutrition Recommendations:   · Monitor ability to restart TF via PEG  · Jevity 1.5 formula initiate at 30 mL/hr and increase in 4 hrs if tolerating to goal of 60 mL/hr  · Water flushes 100 mL Q 4 hrs     Nutrition Assessment: Pt is at nutrition compromise r/t NPO status. Pt w/ hx of CVA, dysphagia and PEG for nutrition. Admitted w/ dislodged PEG. S/p EGD w/  PEG placement today. Plans to restart TF tomorrow am per orders.  Monitor tolerance as TF is restarted     Malnutrition Assessment:  · Malnutrition Status: Insufficient data (hx of mod PCM; wt stable and nutrition is provided via PEG. ) Pt not available     Nutrition Risk Level: High    Nutrition Needs:  · Estimated Daily Total Kcal: 9215-9531 kcal   · Estimated Daily Protein (g): 85-99 grams   · Estimated Daily Fluid (ml/day): 0310-4125 mL    Nutrition Diagnosis:   · Problem: Inadequate oral intake  · Etiology: related to Difficulty swallowing     Signs and symptoms:  as evidenced by NPO status due to medical condition, Nutrition support - EN    Objective Information:  · Nutrition-Focused Physical Findings: no BM; PEG  · Wound Type: None  · Current Nutrition Therapies:  · Oral Diet Orders: NPO   · Oral Diet intake: NPO  · Oral Nutrition Supplement (ONS) Orders: None  · ONS intake: NPO  · Tube Feeding (TF) Orders:   · Feeding Route: Gastrostomy  · Formula: None  · Current TF & Flush Orders Provides: Jevity 1.5 @ 60 mL/hr provides 1440 mL TV, 2160 kcal, 92 grams protein, 1095 mL free water   · Goal TF & Flush Orders Provides: Jevity 1.5 @ 60 mL/hr provides 1440 mL TV, 2160 kcal, 92 grams protein, 1095 mL free water  · Anthropometric Measures:  · Ht: 5' 10\" (177.8 cm)   · Current Body Wt: 155 lb 13.8 oz (70.7 kg)  · Usual Body Wt: 155 lb (70.3 kg) (182lb prior to CVA)  · Ideal Body Wt: 166 lb (75.3 kg)   · BMI Classification: BMI 18.5 - 24.9 Normal Weight    Nutrition Interventions:   Start Tube Feeding  Continued Inpatient Monitoring    Nutrition Evaluation:   · Evaluation: Goals set   · Goals: pt will tolerate TF to provide 100% of needs via PEG   · Monitoring: Nutrition Progression, TF Intake, TF Tolerance      Electronically signed by Tonie Cabot, RD, NHAN on 1/22/19 at 2:47 PM    Contact Number:   Pager: [phone]  Office: [phone]

## 2019-01-22 NOTE — H&P
due to recent GI bleed. Resume statin once able to take it via PEG    BPH:  On flomax, monitor for urinary retention        DVT Prophylaxis: SCD, hold lovenox in case if needs intervention surgically. Diet:    Code Status: Prior  PT/OT Eval Status: at baseline, ambulatory    Dispo - inpt       Colletta Childs, MD    Thank you King Villar for the opportunity to be involved in this patient's care. If you have any questions or concerns please feel free to contact me at [phone].

## 2019-01-22 NOTE — PROCEDURES
[facility]  EGD REPORT    Patient:  Eneida Cummins                  12/12/1929    Endoscopist: LETICIA Murphy [place]     Indication:  Dysphagia, aspiration, hx CVA recent bleeding gastric ulcer, PEG tube dislodged and could not be replaced     Medications:  MAC    Pre-Anesthesia Assessment:  I have reviewed and am in agreement with patient history and medication, including previous response to sedation. Prior to the procedure, a History and Physical was performed, and patient medications and allergies were reviewed. The patient is competent. The risks and benefits of the procedure and the sedation options and risks were discussed with the Billie Martin. Risks discussed included but were not limited to infection, bleeding, perforation, death, and missed lesions, inability to technically place PEG tube. All questions were answered and informed consent was obtained. Patient identification and proposed procedure were verified by the physician and the nurse in the pre-procedure area in the procedure room. [place]: 3  ASA Grade Assessment: 3     After reviewing the risks and benefits, the patient was deemed in satisfactory condition to undergo the procedure. The anesthesia plan was to use MAC anesthesia. Immediately prior to administration of medications, the patient was re-assessed for adequacy to receive sedatives and a time out was performed. Patient and healthcare providers were in agreement it was the correct patient and procedure. The heart rate, respiratory rate, oxygen saturations, blood pressure, adequacy of pulmonary ventilation, and response to care were monitored throughout the procedure. The physical status of the patient was re-assessed after the procedure. After obtaining informed consent, the endoscope was passed under direct vision. The endoscope was introduced through the mouth, and advanced to the second part of duodenum. The EGD was accomplished without difficulty.  The patient tolerated the procedure well. 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 No Evidence of Nguyen's or esophagitis. The epigastric area was cleaned using chlorhexidine solution and 1 % lidocaine was injected. A small cut was made in the skin and subcutaneous tissue. A trocar was passed through this cut into the stomach while observing directly with the endoscope. A thread was passed into the stomach through the trocar. The thread was brought out through the mouth with the help of polypectomy snare and the endoscope. After this, using the pull-through PEG placement technique, PEG was placed succesfully. Secured at 2 cm line. Anterior abdominal wall was dressed with gauze. Rescoped no bleeding from PEG site scant bleeding from biopsy site.     Estimated blood loss 5 ml    Plan:  OK to use PEG for meds  Start tube feeding in AM  DC tomorrow if stable

## 2019-01-22 NOTE — ED NOTES
Home Med List is complete. Source of medications in list is interview with patient and his daughter, as well as call to patient's pharmacy ([facility]).     Patient states he had all his am and lunch meds today. Patient would still need his evening meds, but does not currently have PEG tube access.     Please note:  1. Added to pt's med list: tamsulosin 0.4 mg caps  2. Changes to pt's med list:   -Patient was originally taking allopurinol 100 mg BID, but daughter states he is only taking QD now.   -Patient is taking his miralax every other day due to increased stool frequency when taking it daily.   -Patient's daughter states that patient was having difficulty with residual tube feeds on 8 oz for each meal so they have been doing 8 oz at breakfast and dinner, and then only 4 oz with lunch and before bed.     Rani Gillespie  PharmD Candidate 2019  1/21/2019 8:55 PM

## 2019-01-22 NOTE — PROGRESS NOTES
Pt arrived at unit via stretcher at 2230. Pt A & O. VSS. Pt Stand by assist. Steady gait. Bed alarm in use. Pt uses urinal at bedside. No complaints of pain or nausea voiced. NPO per orders. Pt oriented to room, call light and staff. Will cont to monitor.

## 2019-01-22 NOTE — PROGRESS NOTES
Patient A&O x4, VSS, has been tired from procedure this morning, PEG tube placed in endoscopy and is covered with dressing, no complaints of pain at this time, voids per urinal at bedside, skin intact, family and hospice were her today and they are hoping to get him discharge home in the morning, daughter Jason Samaniego pans to drive the patient home tomorrow.

## 2019-01-22 NOTE — PROGRESS NOTES
Neurovascularly intact grossly . Labs:   Recent Labs      01/21/19 2121   WBC  5.0   HGB  10.2*   HCT  29.6*   PLT  111*     Recent Labs      01/21/19 2121 01/22/19 0601   NA  144  142   K  4.5  3.8   CL  102  104   CO2  32  30   BUN  23*  21*   CREATININE  1.0  1.0   CALCIUM  8.8  9.1     Recent Labs      01/22/19   0601   AST  18   ALT  13   BILIDIR  <0.2   BILITOT  0.4   ALKPHOS  133*     Recent Labs      01/22/19 0601   INR  1.08     No results for input(s): CKTOTAL, TROPONINI in the last 72 hours. No flowsheet data found. Diagnostic Assesment and Plan :   1. Admitted for dislodged feeding tube, got new one 1.22.19. Now only meds, for gt feeds from tomorrow  dysphagia sec to cva, cont npo  bph on flomax    Body mass index is 22.36 kg/m². The following items were considered in medical decision making:  Independent review of images, Review / order clinical lab tests, Review / order radiology, tests Decision to obtain old records. DVT Prophylaxis: lvx   Diet: Diet NPO Effective Now  Diet Tube Feed Continuous/Cyclic w/ Diet  Code Status: Full Code    PT/OT Eval Status:. At his/her baseline . This chart was generated in part by using Dragon Dictation system and may contain errors related to that system including errors in grammar, punctuation, and spelling, as well as words and phrases that may be inappropriate. When dictating, effort is made to correct spelling/grammar errors. If there are any questions or concerns please feel free to contact the dictating provider for clarification. I have discussed the above stated plan with the patient and they verbalized understanding and agreed with the plan. A total time of 15 min were exclusively devoted to discuss plan of care and advanced care planning during this encounter. RN notified about todays plan  bed side. Dispo: will monitor, Discharge in 1 days to home . Needs to stay in hospital for kai gt feeds tomorrow . Alphonso JAVIER Niki Robles MD  [phone]

## 2019-01-22 NOTE — ED PROVIDER NOTES
[facility]    UPPER GASTROINTESTINAL ENDOSCOPY N/A 12/18/2018    EGD CONTROL HEMORRHAGE performed by Socrates Clark MD at [address] [place] [address] History: He reports that he has quit smoking. His smoking use included Cigarettes. He has never used smokeless tobacco. He reports that he drinks about 0.6 oz of alcohol per week . He reports that he does not use drugs. Family History: His family history includes Diabetes in his mother. Medications     Previous Medications    ACETAMINOPHEN (TYLENOL) 325 MG TABLET    2 tablets by PEG Tube route every 4 hours as needed for Pain or Fever    ALLOPURINOL (ZYLOPRIM) 100 MG TABLET    100 mg by PEG Tube route daily     ATORVASTATIN (LIPITOR) 40 MG TABLET    1 tablet by PEG Tube route nightly    MENTHOL-ZINC OXIDE (CALMOSEPTINE) 0.44-20.625 % OINT OINTMENT    Apply topically 2 times daily as needed Max 30 ml per day. NUTRITIONAL SUPPLEMENTS (TWOCAL HN PO)    Take 1 Can by mouth 3 times daily    NYSTATIN (MYCOSTATIN) 036037 UNIT/GM CREAM    Apply topically 2 times daily Apply topically 2 times daily. PANTOPRAZOLE (PROTONIX) 40 MG TABLET    Take 1 tablet by mouth 2 times daily    POLYETHYLENE GLYCOL (GLYCOLAX) PACKET    Take 17 g by mouth daily    SERTRALINE (ZOLOFT) 50 MG TABLET    50 mg by PEG Tube route daily    URSODIOL (ACTIGALL) 300 MG CAPSULE    Take 300 mg by mouth daily (before lunch)    URSODIOL (ACTIGALL) 300 MG CAPSULE    Take 1 capsule by mouth daily (before lunch)       Allergies     Allergies: Allergies as of 01/21/2019 - Review Complete 01/21/2019   Allergen Reaction Noted    Ciprofloxacin  06/08/2018        Physical Exam     INITIAL VITALS: BP: 122/62, Temp: 97.7 °F (36.5 °C), Pulse: 61, Resp: 18, SpO2: 100 %     General: 80 y.o.  male in no apparent distress, well developed, well nourished, non-toxic appearance. HEENT: Atraumatic, normocephalic. EOMs intact.      Neck:  Full range of motion.      Chest/pulm: Respiratory rate

## 2019-01-22 NOTE — ED TRIAGE NOTES
Pt arrived to ED after G tube fell out about 1830 tonight. Family brought g-tube and balloon that holds tube in place has burst. States this is the 7th time this month and they typically only last about 4 days. Suction set up for pt as he is not able to swallow saliva. Denies pain.

## 2019-01-23 VITALS
BODY MASS INDEX: 22.31 KG/M2 | HEART RATE: 55 BPM | HEIGHT: 70 IN | SYSTOLIC BLOOD PRESSURE: 126 MMHG | WEIGHT: 155.87 LBS | OXYGEN SATURATION: 96 % | TEMPERATURE: 98.4 F | RESPIRATION RATE: 16 BRPM | DIASTOLIC BLOOD PRESSURE: 60 MMHG

## 2019-01-23 PROCEDURE — 96365 THER/PROPH/DIAG IV INF INIT: CPT

## 2019-01-23 PROCEDURE — C9113 INJ PANTOPRAZOLE SODIUM, VIA: HCPCS | Performed by: INTERNAL MEDICINE

## 2019-01-23 PROCEDURE — 6360000002 HC RX W HCPCS: Performed by: INTERNAL MEDICINE

## 2019-01-23 PROCEDURE — 96376 TX/PRO/DX INJ SAME DRUG ADON: CPT

## 2019-01-23 PROCEDURE — 2580000003 HC RX 258: Performed by: INTERNAL MEDICINE

## 2019-01-23 PROCEDURE — 6370000000 HC RX 637 (ALT 250 FOR IP): Performed by: INTERNAL MEDICINE

## 2019-01-23 PROCEDURE — 96372 THER/PROPH/DIAG INJ SC/IM: CPT

## 2019-01-23 RX ADMIN — ENOXAPARIN SODIUM 40 MG: 40 INJECTION SUBCUTANEOUS at 10:37

## 2019-01-23 RX ADMIN — ALLOPURINOL 100 MG: 100 TABLET ORAL at 10:37

## 2019-01-23 RX ADMIN — SERTRALINE HYDROCHLORIDE 50 MG: 50 TABLET ORAL at 10:37

## 2019-01-23 RX ADMIN — URSODIOL 300 MG: 300 CAPSULE ORAL at 10:37

## 2019-01-23 RX ADMIN — Medication 10 ML: at 10:38

## 2019-01-23 RX ADMIN — PANTOPRAZOLE SODIUM 40 MG: 40 INJECTION, POWDER, FOR SOLUTION INTRAVENOUS at 10:37

## 2019-01-23 ASSESSMENT — PAIN DESCRIPTION - PAIN TYPE: TYPE: SURGICAL PAIN

## 2019-01-23 ASSESSMENT — PAIN DESCRIPTION - ONSET: ONSET: GRADUAL

## 2019-01-23 ASSESSMENT — PAIN DESCRIPTION - LOCATION: LOCATION: ABDOMEN

## 2019-01-23 ASSESSMENT — PAIN DESCRIPTION - FREQUENCY: FREQUENCY: INTERMITTENT

## 2019-01-23 ASSESSMENT — PAIN DESCRIPTION - DESCRIPTORS: DESCRIPTORS: PATIENT UNABLE TO DESCRIBE

## 2019-01-23 ASSESSMENT — PAIN SCALES - GENERAL: PAINLEVEL_OUTOF10: 2

## 2019-01-23 ASSESSMENT — PAIN DESCRIPTION - PROGRESSION: CLINICAL_PROGRESSION: GRADUALLY WORSENING

## 2019-01-23 NOTE — CARE COORDINATION
beds program?: No    Notification completed in HENS/PAS? N/a    IMM  n/a      Discharge disposition:    LOC home with hospice and private care givers      Mode of Transport private car, daughter to transport    Jameel Mcdaniels and his family were provided with choice of provider; he and his family are in agreement with the discharge plan.       Care Transitions patient:no     CHUCK Hsu, [place]  [facility]  Case Management Department  Ph: [phone]

## 2019-01-23 NOTE — DISCHARGE INSTR - COC
Active Problems:  Patient Active Problem List   Diagnosis Code    TIA (transient ischemic attack) G45.9    HTN (hypertension) I10    Acute CVA (cerebrovascular accident) ([facility] I63.9    Bradycardia R00.1    Dizziness R42    Moderate malnutrition (HCC) E44.0    Ischemic stroke ([facility] [address] [facility] I63.9    Adjustment disorder with depressed mood F43.21    Left-sided weakness R53.1    Pharyngeal dysphagia R13.13    Encounter for palliative care Z51.5    DNR (do not resuscitate) Z66    Cholelithiasis, common bile duct K80.50    Generalized weakness R53.1    Advance care planning Z71.89    Encounter for hospice care discussion Z71.89    Shortness of breath R06.02    UGIB (upper gastrointestinal bleed) K92.2    PEG tube malfunction (HCC) K94.23       Isolation/Infection:   Isolation          No Isolation            Nurse Assessment:  Last Vital Signs: BP (!) 142/57   Pulse 58   Temp 98.1 °F (36.7 °C) (Oral)   Resp 16   Ht 5' 10\" (1.778 m)   Wt 155 lb 13.8 oz (70.7 kg)   SpO2 96%   BMI 22.36 kg/m²     Last documented pain score (0-10 scale): Pain Level: 2  Last Weight:   Wt Readings from Last 1 Encounters:   01/22/19 155 lb 13.8 oz (70.7 kg)     Mental Status:  {IP PT MENTAL STATUS:92037}    IV Access:  { BOOM IV ACCESS:975681193}    Nursing Mobility/ADLs:  Walking   {P DME NYVS:963352328}  Transfer  {[facility] DME KZYN:983936647}  Bathing  {[facility] DME JOCK:685664596}  Dressing  {P DME YJYF:949435300}  Toileting  {[facility] DME APHY:803766012}  Feeding  {[facility] DME IZLU:594876611}  Med Admin  {[facility] DME NESI:057946409}  Med Delivery   { BOOM MED Delivery:747875048}    Wound Care Documentation and Therapy:        Elimination:  Continence:   · Bowel: {YES / ZS:95037}  · Bladder: {YES / IF:84557}  Urinary Catheter: {Urinary Catheter:913883961}   Colostomy/Ileostomy/Ileal Conduit: {YES / RA:50017}       Date of Last BM: ***    Intake/Output Summary (Last 24 hours) at 01/23/19 1022  Last data filed at 01/23/19 3463   Gross information and transfer of Faustina Paredes  is necessary for the continuing treatment of the diagnosis listed and that he requires Home Care for greater 30 days. Update Admission H&P: No change in H&P    PHYSICIAN SIGNATURE:  Electronically signed by Kalpesh Arriaza.  Niki Robles MD on 1/23/19 at 10:22 AM

## 2019-01-23 NOTE — PROGRESS NOTES
[address] [place] [address]  GI Progress Note        Ophelia Calvo is a 80 y.o. male patient. 1. Gastrostomy tube dysfunction ([address]        Admit Date: 2019    Subjective:       Stable post PEG no issues some mild pain at PEG incision site    Scheduled Meds:   pantoprazole  40 mg Intravenous BID    enoxaparin  40 mg Subcutaneous Daily    allopurinol  100 mg PEG Tube Daily    atorvastatin  40 mg PEG Tube Nightly    nystatin   Topical BID    sertraline  50 mg PEG Tube Daily    tamsulosin  0.4 mg Oral Daily    ursodiol  300 mg PEG Tube Daily before lunch    sodium chloride flush  10 mL Intravenous 2 times per day       PRN Meds:  acetaminophen, menthol-zinc oxide, sodium chloride flush, magnesium hydroxide, ondansetron      Objective:       Patient Vitals for the past 24 hrs:   BP Temp Temp src Pulse Resp SpO2   19 0832 (!) 142/57 98.1 °F (36.7 °C) Oral 58 16 96 %   19 0348 (!) 121/53 97.4 °F (36.3 °C) Oral 57 16 92 %   19 2335 (!) 114/48 97.7 °F (36.5 °C) Oral 57 16 97 %   19 (!) 130/59 97.8 °F (36.6 °C) Oral 56 16 99 %   19 1609 (!) 120/46 97.3 °F (36.3 °C) Axillary 55 - 100 %   19 1322 (!) 122/59 - Oral 51 16 -   19 1042 (!) 131/57 96 °F (35.6 °C) Oral (!) 48 18 99 %   19 1000 110/60 - - (!) 47 18 100 %   19 0944 (!) 107/50 - - 51 18 98 %   19 0938 (!) 105/51 - - (!) 49 18 96 %       Exam:  VITALS:  BP (!) 142/57   Pulse 58   Temp 98.1 °F (36.7 °C) (Oral)   Resp 16   Ht 5' 10\" (1.778 m)   Wt 155 lb 13.8 oz (70.7 kg)   SpO2 96%   BMI 22.36 kg/m²   TEMPERATURE:  Current - Temp: 98.1 °F (36.7 °C);  Max - Temp  Av.4 °F (36.3 °C)  Min: 96 °F (35.6 °C)  Max: 98.1 °F (36.7 °C)    NAD  General appearance: alert, appears stated age, cooperative and no distress  Abdomen: soft ND tender at PEG incision site, scant old blood on dressing, PEG bumper loosened slightly        Recent Labs      19   2121   WBC  5.0   HGB  10.2*   HCT

## 2019-01-23 NOTE — PROGRESS NOTES
Patient in bed with eyes open. Pain rated 2/10 surgical. See MAR. VSS, assessment complete. RA, SB on tele. Call light in reach and all needs met at this time. Will continue to monitor.

## 2019-01-23 NOTE — DISCHARGE SUMMARY
tablet, Refills: 3      menthol-zinc oxide (CALMOSEPTINE) 0.44-20.625 % OINT ointment Apply topically 2 times daily as needed Max 30 ml per day. polyethylene glycol (GLYCOLAX) packet Take 17 g by mouth every other day       nystatin (MYCOSTATIN) 984420 UNIT/GM cream Apply topically 2 times daily Apply topically 2 times daily. sertraline (ZOLOFT) 50 MG tablet 50 mg by PEG Tube route daily      ursodiol (ACTIGALL) 300 MG capsule 300 mg by PEG Tube route daily (before lunch)       Nutritional Supplements (TWOCAL HN PO) Take 1 Can by mouth 3 times daily      acetaminophen (TYLENOL) 325 MG tablet 2 tablets by PEG Tube route every 4 hours as needed for Pain or Fever  Qty: 120 tablet, Refills: 3      atorvastatin (LIPITOR) 40 MG tablet 1 tablet by PEG Tube route nightly  Qty: 30 tablet, Refills: 3      allopurinol (ZYLOPRIM) 100 MG tablet 100 mg by PEG Tube route daily              Time Spent on discharge is more than 30 minutes in the examination, evaluation, counseling and review of medications and discharge plan with the patient. Family was notified regarding discharge. The patient Raúl Noyola was advised to consult their PCP/ or call 911 to proceed to nearest ED in the event if symptoms are not getting better and are getting worse . This chart was generated in part by using Dragon Dictation system and may contain errors related to that system including errors in grammar, punctuation, and spelling, as well as words and phrases that may be inappropriate. When dictating, effort is made to correct spelling/grammar errors. If there are any questions or concerns please feel free to contact the dictating provider for clarification. Signed:    Alphonso Ariza MD   1/23/2019      Thank you Justo Wiseman for the opportunity to be involved in this patient's care. If you have any questions or concerns please feel free to contact me at [phone].

## 2022-06-06 NOTE — FLOWSHEET NOTE
01/22/19 0954   Encounter Summary   Services provided to: Patient   Referral/Consult From: Phil [facility] Visiting (1/22, [place] )   Complexity of Encounter Moderate   Length of Encounter 15 minutes   Routine   Type Pre-procedure   Assessment Calm; Approachable   Intervention Explored feelings, thoughts, concerns;Explored coping resources;Nurtured hope   Outcome Comfort;Expressed gratitude Last visit 5-  Next visit   9-    Last labs   GOT/AST (Units/L)   Date Value   04/12/2022 8     GPT/ALT (Units/L)   Date Value   04/12/2022 20     Creatinine (mg/dL)   Date Value   04/13/2022 0.94     CE checked nothing recent

## 2024-04-26 NOTE — PROGRESS NOTES
[address] [place] [address]  GI Progress Note        Uli Danielson is a 80 y.o. male patient. 1. Upper GI bleed        Admit Date: 2018    Subjective:       No further bleeding. Hb stable. On PPI. ROS:  Cardiovascular ROS: no chest pain or dyspnea on exertion  Gastrointestinal ROS: no abdominal pain, change in bowel habits, or black or bloody stools  Respiratory ROS: no cough, shortness of breath, or wheezing    Scheduled Meds:   polyethylene glycol  17 g Per G Tube Daily    ursodiol  300 mg Per G Tube Daily before lunch    [START ON 2018] pantoprazole  40 mg Oral QAM AC    allopurinol  100 mg PEG Tube Daily    atorvastatin  40 mg PEG Tube Nightly    sertraline  50 mg PEG Tube Daily    sodium chloride flush  10 mL Intravenous 2 times per day       Continuous Infusions:      PRN Meds:  menthol-zinc oxide, sodium chloride flush, acetaminophen, ondansetron      Objective:       Patient Vitals for the past 24 hrs:   BP Temp Temp src Pulse Resp SpO2   18 1634 (!) 118/52 97.8 °F (36.6 °C) Oral 54 21 96 %   18 1232 (!) 115/52 97.8 °F (36.6 °C) Oral 52 20 96 %   18 0926 (!) 113/54 97 °F (36.1 °C) Oral 57 21 96 %   18 0400 (!) 105/7 97 °F (36.1 °C) Oral 55 20 -   18 2354 (!) 119/50 98.3 °F (36.8 °C) Oral 57 20 95 %   18 1831 (!) 127/53 98.2 °F (36.8 °C) Oral 57 18 95 %       Exam:  VITALS:  BP (!) 118/52   Pulse 54   Temp 97.8 °F (36.6 °C) (Oral)   Resp 21   Ht 5' 10\" (1.778 m)   Wt 156 lb 1.4 oz (70.8 kg)   SpO2 96%   BMI 22.40 kg/m²   TEMPERATURE:  Current - Temp: 97.8 °F (36.6 °C);  Max - Temp  Av.7 °F (36.5 °C)  Min: 97 °F (36.1 °C)  Max: 98.3 °F (36.8 °C)    NAD  General appearance: alert, appears stated age, cooperative and no distress  Head: Normocephalic, without obvious abnormality, atraumatic  Neck: supple, symmetrical, trachea midline and thyroid not enlarged, symmetric, no tenderness/mass/nodules  CVS:  RRR, Nl s1s2  Lungs CTA
Lab called with critical value.  Resident notified
Progress Note    Admit Date: 12/17/2018  Diet: DIET GENERAL;  CC: Melena    Subjective: Patient was seen and examined at bedside. No acute events overnight. S/p EGD with bipolar cautery. Patient not in any acute distress. Patient denies any headache, chest pain, SOB, abdominal pain, nausea, vomiting. Patients Hb improved- 9.2 s/p 1uPRBC    Medications:     Scheduled Meds:   polyethylene glycol  17 g Per G Tube Daily    ursodiol  300 mg Per G Tube Daily before lunch    pantoprazole  40 mg Intravenous BID    allopurinol  100 mg PEG Tube Daily    atorvastatin  40 mg PEG Tube Nightly    sertraline  50 mg PEG Tube Daily    sodium chloride flush  10 mL Intravenous 2 times per day     Continuous Infusions:   lactated ringers 75 mL/hr at 12/19/18 0946     PRN Meds:menthol-zinc oxide, sodium chloride flush, acetaminophen, ondansetron    Objective:   Vitals:   T-max:  Patient Vitals for the past 8 hrs:   BP Temp Temp src Pulse Resp SpO2   12/19/18 1232 (!) 115/52 97.8 °F (36.6 °C) Oral 52 20 96 %   12/19/18 0926 (!) 113/54 97 °F (36.1 °C) Oral 57 21 96 %       Intake/Output Summary (Last 24 hours) at 12/19/18 1406  Last data filed at 12/19/18 1357   Gross per 24 hour   Intake           2177.5 ml   Output             1450 ml   Net            727.5 ml       Physical Exam   Constitutional: He is oriented to person, place, and time. No distress. HENT:   Head: Normocephalic and atraumatic. Right Ear: External ear normal.   Left Ear: External ear normal.   Nose: Nose normal.   Mouth/Throat: Oropharynx is clear and moist. No oropharyngeal exudate. Eyes: Conjunctivae and EOM are normal.   Neck: Normal range of motion. No thyromegaly present. Cardiovascular: Normal rate, regular rhythm and normal heart sounds. Pulmonary/Chest: Effort normal and breath sounds normal. No respiratory distress. He exhibits no tenderness. Abdominal: Soft. He exhibits no distension. There is no tenderness.    Musculoskeletal: Normal
Pt alert and oriented.  Awaiting transport to floor
Pt arrived to pre op, pt denies complaints, verbalizes understanding of procedure.
Pt last Hgb was 8.5, not active bleeding noted. RA resp easy, lungs diminished. Vital signs stable, afebrile. LR @ 125 ml/hr continue. Pt voiding small amounts at a time. Bladder scanned for 218. Pt denies any discomfort. Will continue to monitor.
Diagnosis Date Noted    UGIB (upper gastrointestinal bleed) [K92.2] 12/17/2018      UGIB (Peptic ulcer v. Diverticulosis v. Irritation from PEG tube) - one week of dark, formed stools   - Hbg trended down to 7.0 this AM (7.8-->7.4-->7.0) decreased from 10.9 in 11/2018  - FOBT heme positive   - fluids @ 150 mL/hr  - protonix (80 IV given in ED), start gtt at 8 mg/hr  - H/H Q6h  - INR wnl (1.1)    - avoid NSAID / ASA / A/C   - 1U prepped  - awaiting GI recs  - transfuse if Hgb below 7    HTN  - doxazosin, aldactazide held in light of low blood pressure on admission     Depression  - continue home sertraline      Gallstones  - contine home ursodiol     Gout  - continue home allopurinol      DVT Prophylaxis:   Diet: Diet NPO Effective Now Exceptions are: Ice Chips  Code Status: Limited     Dispo - [facility], palliative care consult    Rosa Huerta DO, PGY-1  Pager [phone] ***  12/18/2018,  8:41 AM
Need to verify Percocet - noted on HD paperwork but not seen on [place]  - once done can complete med rec. Added oxy 5R mg q6h prn for mod ot sevre pain  DVT ppx: heparin bid   Diet: Passed dysphagia screen dysphagia screen,. Renal DASH TLC easy to chew  On prn O2 at home- CT chest pending, history of pleural effusions requiring tap  Plan of care discussed with hayden Vincent [phone]

## (undated) DEVICE — FORCEPS BX L240CM DIA2.4MM L NDL RAD JAW 4 133340

## (undated) DEVICE — Device

## (undated) DEVICE — BIPOLAR ELECTROHEMOSTASIS CATHETER: Brand: INJECTION GOLD PROBE